# Patient Record
Sex: FEMALE | Race: WHITE | NOT HISPANIC OR LATINO | Employment: UNEMPLOYED | URBAN - METROPOLITAN AREA
[De-identification: names, ages, dates, MRNs, and addresses within clinical notes are randomized per-mention and may not be internally consistent; named-entity substitution may affect disease eponyms.]

---

## 2017-10-12 ENCOUNTER — APPOINTMENT (OUTPATIENT)
Dept: WOMENS IMAGING | Facility: HOSPITAL | Age: 57
End: 2017-10-12

## 2017-10-12 PROCEDURE — 77067 SCR MAMMO BI INCL CAD: CPT | Performed by: RADIOLOGY

## 2019-01-31 ENCOUNTER — APPOINTMENT (OUTPATIENT)
Dept: WOMENS IMAGING | Facility: HOSPITAL | Age: 59
End: 2019-01-31

## 2019-01-31 PROCEDURE — 77067 SCR MAMMO BI INCL CAD: CPT | Performed by: RADIOLOGY

## 2019-01-31 PROCEDURE — 77063 BREAST TOMOSYNTHESIS BI: CPT | Performed by: RADIOLOGY

## 2019-02-26 ENCOUNTER — APPOINTMENT (OUTPATIENT)
Dept: WOMENS IMAGING | Facility: HOSPITAL | Age: 59
End: 2019-02-26

## 2019-02-26 PROCEDURE — 76641 ULTRASOUND BREAST COMPLETE: CPT | Performed by: RADIOLOGY

## 2019-02-26 PROCEDURE — 77061 BREAST TOMOSYNTHESIS UNI: CPT | Performed by: RADIOLOGY

## 2019-02-26 PROCEDURE — 77065 DX MAMMO INCL CAD UNI: CPT | Performed by: RADIOLOGY

## 2019-02-26 PROCEDURE — G0279 TOMOSYNTHESIS, MAMMO: HCPCS | Performed by: RADIOLOGY

## 2019-02-26 PROCEDURE — MDREVIEWSP: Performed by: RADIOLOGY

## 2019-06-04 ENCOUNTER — APPOINTMENT (OUTPATIENT)
Dept: WOMENS IMAGING | Facility: HOSPITAL | Age: 59
End: 2019-06-04

## 2019-06-04 PROCEDURE — 19000 PUNCTURE ASPIR CYST BREAST: CPT | Performed by: RADIOLOGY

## 2019-06-04 PROCEDURE — 76942 ECHO GUIDE FOR BIOPSY: CPT | Performed by: RADIOLOGY

## 2019-06-04 PROCEDURE — 19001 PUNCTURE ASPIR CYST BRST EA: CPT | Performed by: RADIOLOGY

## 2020-05-11 ENCOUNTER — APPOINTMENT (OUTPATIENT)
Dept: WOMENS IMAGING | Facility: HOSPITAL | Age: 60
End: 2020-05-11

## 2020-05-11 PROCEDURE — 76641 ULTRASOUND BREAST COMPLETE: CPT | Performed by: RADIOLOGY

## 2020-05-11 PROCEDURE — 77062 BREAST TOMOSYNTHESIS BI: CPT | Performed by: RADIOLOGY

## 2020-05-11 PROCEDURE — 77066 DX MAMMO INCL CAD BI: CPT | Performed by: RADIOLOGY

## 2020-05-11 PROCEDURE — G0279 TOMOSYNTHESIS, MAMMO: HCPCS | Performed by: RADIOLOGY

## 2020-05-13 ENCOUNTER — APPOINTMENT (OUTPATIENT)
Dept: WOMENS IMAGING | Facility: HOSPITAL | Age: 60
End: 2020-05-13

## 2020-05-13 PROCEDURE — 76942 ECHO GUIDE FOR BIOPSY: CPT | Performed by: RADIOLOGY

## 2020-05-13 PROCEDURE — 19000 PUNCTURE ASPIR CYST BREAST: CPT | Performed by: RADIOLOGY

## 2021-05-15 ENCOUNTER — IMMUNIZATION (OUTPATIENT)
Dept: VACCINE CLINIC | Facility: HOSPITAL | Age: 61
End: 2021-05-15

## 2021-05-15 PROCEDURE — 91300 HC SARSCOV02 VAC 30MCG/0.3ML IM: CPT | Performed by: INTERNAL MEDICINE

## 2021-05-15 PROCEDURE — 0001A: CPT | Performed by: INTERNAL MEDICINE

## 2021-06-05 ENCOUNTER — APPOINTMENT (OUTPATIENT)
Dept: VACCINE CLINIC | Facility: HOSPITAL | Age: 61
End: 2021-06-05

## 2021-06-17 ENCOUNTER — APPOINTMENT (OUTPATIENT)
Dept: WOMENS IMAGING | Facility: HOSPITAL | Age: 61
End: 2021-06-17

## 2021-06-17 PROCEDURE — 77067 SCR MAMMO BI INCL CAD: CPT | Performed by: RADIOLOGY

## 2021-06-17 PROCEDURE — 77063 BREAST TOMOSYNTHESIS BI: CPT | Performed by: RADIOLOGY

## 2021-07-21 ENCOUNTER — APPOINTMENT (OUTPATIENT)
Dept: WOMENS IMAGING | Facility: HOSPITAL | Age: 61
End: 2021-07-21

## 2021-07-21 PROCEDURE — G0279 TOMOSYNTHESIS, MAMMO: HCPCS | Performed by: RADIOLOGY

## 2021-07-21 PROCEDURE — 77065 DX MAMMO INCL CAD UNI: CPT | Performed by: RADIOLOGY

## 2021-07-21 PROCEDURE — 77061 BREAST TOMOSYNTHESIS UNI: CPT | Performed by: RADIOLOGY

## 2021-07-21 PROCEDURE — 76641 ULTRASOUND BREAST COMPLETE: CPT | Performed by: RADIOLOGY

## 2021-08-19 ENCOUNTER — OFFICE VISIT (OUTPATIENT)
Dept: INTERNAL MEDICINE | Facility: CLINIC | Age: 61
End: 2021-08-19

## 2021-08-19 VITALS
TEMPERATURE: 97.1 F | HEIGHT: 67 IN | OXYGEN SATURATION: 97 % | HEART RATE: 80 BPM | DIASTOLIC BLOOD PRESSURE: 68 MMHG | BODY MASS INDEX: 24.61 KG/M2 | WEIGHT: 156.8 LBS | SYSTOLIC BLOOD PRESSURE: 100 MMHG

## 2021-08-19 DIAGNOSIS — Z00.00 ANNUAL PHYSICAL EXAM: Primary | ICD-10-CM

## 2021-08-19 DIAGNOSIS — Z13.1 SCREENING FOR DIABETES MELLITUS: ICD-10-CM

## 2021-08-19 DIAGNOSIS — Z12.11 SCREENING FOR COLON CANCER: ICD-10-CM

## 2021-08-19 DIAGNOSIS — Z13.220 SCREENING FOR HYPERLIPIDEMIA: ICD-10-CM

## 2021-08-19 LAB
CHOLEST SERPL-MCNC: 178 MG/DL (ref 0–200)
HBA1C MFR BLD: 5.3 % (ref 4.8–5.6)
HDLC SERPL-MCNC: 58 MG/DL (ref 40–60)
LDLC SERPL CALC-MCNC: 105 MG/DL (ref 0–100)
LDLC/HDLC SERPL: 1.8 {RATIO}
TRIGL SERPL-MCNC: 79 MG/DL (ref 0–150)
VLDLC SERPL CALC-MCNC: 15 MG/DL (ref 5–40)

## 2021-08-19 PROCEDURE — 99386 PREV VISIT NEW AGE 40-64: CPT | Performed by: STUDENT IN AN ORGANIZED HEALTH CARE EDUCATION/TRAINING PROGRAM

## 2021-08-19 RX ORDER — CHLORAL HYDRATE 500 MG
1 CAPSULE ORAL
COMMUNITY

## 2021-08-19 RX ORDER — MAGNESIUM 200 MG
1 TABLET ORAL
COMMUNITY

## 2021-08-19 NOTE — PATIENT INSTRUCTIONS

## 2021-08-19 NOTE — PROGRESS NOTES
"Chief Complaint  Annual checkup    HISTORY    Beronica Price is a 61 y.o. female who presents to the office today as a  a new patient for their annual preventative exam. Their last preventative exam was about 5 years ago approx with Dr Childers.  She last did her gynecology exam with GYN in June Dr Shabana Reynaga.     Pt states her dad got COVID at the prison home and then she got COVID Feburary 2021. She got her COVID vaccine a few months ago.   She states some of her smell is still messed up and has some early fatigue, doesn't necessarily have to be at the end of the day. If she takes a nap she feels better.  At the same time as her Covid onset she had a sharp pain left-sided chest that was better when she held her breath.  That has resolved completely and she denies chest pain pressure or tightness or shortness of air at either rest or with exertion.    No hospitalization(s) within the last year.     Status of chronic medical conditions:  *Thyroid nodules: sees an ENT provider at [facility] for that. In the spring she saw them and they did an ultrasound and they were reportedly unchanged in size . Years ago they biopsied them and they were \"fine\".   *Breast cysts: she has had multiple biopsies for cysts and she is getting frustrated. She has had those done at [facility]. She opted to not go back for now around 2 or 3 weeks ago for more aspiration. She is OK getting her mammogram every year but she no longer wants aspirations of cysts. She now takes vitamin E and iodine at the recommendation of her GYN Dr Reynaga for this.         Allergies   Allergen Reactions   • Gentamycin [Gentamicin] Hives   • Penicillins Hives        Outpatient Medications Marked as Taking for the 8/19/21 encounter (Office Visit) with Ulises Keller, DO   Medication Sig Dispense Refill   • ESTROGEL 0.75 MG/1.25 GM (0.06%) topical gel APPLY 2 PUMPS TO SKIN D UTD  0   • Magnesium 200 MG tablet Take 1 tablet by mouth.   " "  • medroxyPROGESTERone (PROVERA) 2.5 MG tablet TK 1 T PO  D  11   • Omega-3 1000 MG capsule Take 1 capsule by mouth.     • vitamin D3 125 MCG (5000 UT) capsule capsule Take 5,000 Units by mouth Daily.          Past Medical History:   Diagnosis Date   • Bilateral breast cysts    • Dense breasts    • Multiple thyroid nodules    • Transaminitis    • Uterine polyp      Past Surgical History:   Procedure Laterality Date   • ENDOMETRIAL ABLATION  2003   • MASTOIDECTOMY     • TONSILLECTOMY       Family History   Problem Relation Age of Onset   • Hypertension Mother    • COPD Mother    • Atrial fibrillation Father    • Heart block Father    • Multiple sclerosis Sister    • No Known Problems Brother    • Uterine cancer Maternal Grandmother 48   • Prostate cancer Maternal Grandfather    • Heart block Paternal Grandmother    • No Known Problems Sister    • Coronary artery disease Brother 63    reports that she has never smoked. She has never used smokeless tobacco. She reports current alcohol use of about 2.0 standard drinks of alcohol per week. She reports that she does not use drugs.    Immunization History   Administered Date(s) Administered   • COVID-19 (PFIZER) 05/15/2021, 06/16/2021   • Flu Vaccine Quad PF >36MO 10/12/2017, 10/05/2020   • Influenza, Unspecified 02/02/2017, 01/31/2019        OBJECTIVE    Vital Signs:   /68   Pulse 80   Temp 97.1 °F (36.2 °C) (Temporal)   Ht 171 cm (67.32\")   Wt 71.1 kg (156 lb 12.8 oz)   SpO2 97%   BMI 24.32 kg/m²     Physical Exam  Constitutional:       General: She is not in acute distress.     Appearance: Normal appearance.   HENT:      Head: Normocephalic and atraumatic.      Right Ear: Tympanic membrane, ear canal and external ear normal. There is no impacted cerumen.      Left Ear: Tympanic membrane, ear canal and external ear normal. There is no impacted cerumen.      Mouth/Throat:      Mouth: Mucous membranes are moist.      Pharynx: No oropharyngeal exudate or " posterior oropharyngeal erythema.   Eyes:      General: No scleral icterus.     Extraocular Movements: Extraocular movements intact.      Conjunctiva/sclera: Conjunctivae normal.      Pupils: Pupils are equal, round, and reactive to light.   Cardiovascular:      Rate and Rhythm: Normal rate and regular rhythm.      Heart sounds: Normal heart sounds. No murmur heard.     Pulmonary:      Effort: Pulmonary effort is normal. No respiratory distress.      Breath sounds: Normal breath sounds. No wheezing.   Abdominal:      General: Bowel sounds are normal. There is no distension.      Palpations: Abdomen is soft.      Tenderness: There is abdominal tenderness (LLQ). There is no guarding.   Musculoskeletal:      Cervical back: Neck supple.      Right lower leg: No edema.      Left lower leg: No edema.   Lymphadenopathy:      Cervical: No cervical adenopathy.   Skin:     General: Skin is warm and dry.      Coloration: Skin is not jaundiced.   Neurological:      General: No focal deficit present.      Mental Status: She is alert and oriented to person, place, and time.      Cranial Nerves: No cranial nerve deficit.      Motor: No weakness.   Psychiatric:         Mood and Affect: Mood normal.         Behavior: Behavior normal.         Thought Content: Thought content normal.            ASSESSMENT & PLAN     #Annual Preventative Health Examination  -Patient presents today for preventative health exam. The patient's last preventative health exam was 5 years. Discussed with patient the importance of an annual preventative exam and encouraged continued yearly annual exams.   -Age and sex appropriate physical exam performed and documented. Updated past medical, family, social and surgical histories as well as allergies and care team list. Addressed care gaps listed in the medical record.  -Encouraged seat belt use for every car ride for patient and all occupants. Discussed securing of all guns in the home for patient and family  protection. Encouraged sunscreen use to reduce risk of skin cancer for any days with sun exposure over 20 minutes. Recommended helmet if biking or riding motorcycle to prevent head trauma. Discussed the importance of smoke and carbon monoxide detectors in the home.   -Encouraged annual dental and vision exams as part of their overall health.  -Encouraged minimum of 30 minutes or more of exercise at a brisk walk or higher 5 days per week combined with a well-balanced diet. Exercise and dietary instructions will be provided in after visit summary.  -Immunizations reviewed and updated in EMR.  -Lipid screening:  will order lipid panel today  -Aspirin for primary or secondary prevention: Advised patient that aspirin 81 mg for prevention of coronary artery disease in healthy adults is controversial due to risk of GI bleeding, though some studies have shown that it could lead to decrease in colon cancer and heart attack. The risk for bleeding goes up as you get older and the risks likely outweigh the benefits in patients with a history of GI bleeding or peptic ulcer disease or bleeding at other sites, age > 70 years, thrombocytopenia, coagulopathy, chronic kidney disease, or concurrent use of other medications that increase bleeding risks such as NSAIDs, steroids, anticoagulants or warfarin. Patient declined aspirin therapy.  -Depression screening: PHQ2 performed and the patient's screen was negative.   -Diabetes screening:  Pt wishes to have A1c today, will order.   -Tobacco use screening: Patient denies cigarette use.   -Alcohol use screening: Patient reports drinking 2 drinks per week, negative screening.   -Illicit drug screening: Patient does not use illicit drugs.   -Hypertension screening: Patient screened negative for HTN today.  -HIV screening: Discussed with patient the importance of identifying asymptomatic HIV infection for adults in their age group with a one time screening test .Patient declined screening.    -Syphilis screening: Syphilis screening not indicated.  -Hepatitis B virus screening: Screening not indicated, not in a high-risk group.  -Hepatitis C virus screening:  Discussed with patient that the USPSTF recommends a one-time screening of hepatitis C in all adults 18-79 years old. Patient declined screening.  -Colon cancer screening: Patient is age 45-75 and I discussed the importance of colon cancer screening in order to detect cancerous or pre-cancerous lesions early in an effort to limit severity of cancer and possibly death if it is present. I informed the patient that screening for colon cancer has risks but these risks are small and include risks associated with a colonoscopy such as bleeding, pain or perforation, or the potential to need additional follow up tests or surgeries depending on the results. Discussed the options of colon cancer screening (i.e., colonoscopy vs FIT testing) and the benefits and disadvantages of each. Patient accepted screening. Will order cologuard today per patient's preference.   -Lung cancer screening: Patient has never smoked.  -Cervical cancer screening:  Informed patient that the USPSTF recommends screening for cervical cancer every 3 years with cervical cytology alone in women aged 21 to 29 years. For women aged 30 to 65 years, the USPSTF recommends screening every 3 years with cervical cytology alone, every 5 years with high-risk human papillomavirus (hrHPV) testing alone, or every 5 years with hrHPV testing in combination with cytology (cotesting). Lasp pap: approximate date 2021 and was normal will obtain records  -Breast cancer screening: Informed patient that the USPSTF recommends biennial screening mammography for women aged 50 to 74 years. patient is already seeing GYN and having mammograms and has a history of breast cysts for which she periodically has had aspirations. Will obtain records  -BRCA related cancer screening: Informed patient that the USPSTF  recommends that primary care clinicians assess women with a personal or family history of breast, ovarian, tubal, or peritoneal cancer or who have an ancestry associated with breast cancer susceptibility 1 and 2 (BRCA1/2) gene mutations with an appropriate brief familial risk assessment tool and referred to genetic counseling if risk assessment is positive. Patient does not have a known personal or family history.   -Osteoporosis screening: Informed patient that the USPSTF recommends screening for osteoporosis with bone measurement testing to prevent osteoporotic fractures in women 65 years and older. Not age 65 yet but she has had one before and it was normal.       Follow Up  Follow up in 1 year for annual physical exam.    Patient/family had no further questions at this time and verbalized understanding of the plan discussed today.

## 2021-08-20 LAB
ALBUMIN SERPL-MCNC: 4.8 G/DL (ref 3.5–5.2)
ALBUMIN/GLOB SERPL: 2.3 G/DL
ALP SERPL-CCNC: 75 U/L (ref 39–117)
ALT SERPL-CCNC: 20 U/L (ref 1–33)
AST SERPL-CCNC: 17 U/L (ref 1–32)
BASOPHILS # BLD AUTO: 0.07 10*3/MM3 (ref 0–0.2)
BASOPHILS NFR BLD AUTO: 0.9 % (ref 0–1.5)
BILIRUB SERPL-MCNC: 0.4 MG/DL (ref 0–1.2)
BUN SERPL-MCNC: 11 MG/DL (ref 8–23)
BUN/CREAT SERPL: 16.2 (ref 7–25)
CALCIUM SERPL-MCNC: 10 MG/DL (ref 8.6–10.5)
CHLORIDE SERPL-SCNC: 106 MMOL/L (ref 98–107)
CO2 SERPL-SCNC: 24.6 MMOL/L (ref 22–29)
CREAT SERPL-MCNC: 0.68 MG/DL (ref 0.57–1)
EOSINOPHIL # BLD AUTO: 0.05 10*3/MM3 (ref 0–0.4)
EOSINOPHIL NFR BLD AUTO: 0.6 % (ref 0.3–6.2)
ERYTHROCYTE [DISTWIDTH] IN BLOOD BY AUTOMATED COUNT: 12.4 % (ref 12.3–15.4)
GLOBULIN SER CALC-MCNC: 2.1 GM/DL
GLUCOSE SERPL-MCNC: 91 MG/DL (ref 65–99)
HCT VFR BLD AUTO: 43.1 % (ref 34–46.6)
HGB BLD-MCNC: 14.2 G/DL (ref 12–15.9)
IMM GRANULOCYTES # BLD AUTO: 0.05 10*3/MM3 (ref 0–0.05)
IMM GRANULOCYTES NFR BLD AUTO: 0.6 % (ref 0–0.5)
LYMPHOCYTES # BLD AUTO: 1.28 10*3/MM3 (ref 0.7–3.1)
LYMPHOCYTES NFR BLD AUTO: 16.6 % (ref 19.6–45.3)
Lab: NORMAL
MCH RBC QN AUTO: 30.3 PG (ref 26.6–33)
MCHC RBC AUTO-ENTMCNC: 32.9 G/DL (ref 31.5–35.7)
MCV RBC AUTO: 92.1 FL (ref 79–97)
MONOCYTES # BLD AUTO: 0.5 10*3/MM3 (ref 0.1–0.9)
MONOCYTES NFR BLD AUTO: 6.5 % (ref 5–12)
NEUTROPHILS # BLD AUTO: 5.76 10*3/MM3 (ref 1.7–7)
NEUTROPHILS NFR BLD AUTO: 74.8 % (ref 42.7–76)
NRBC BLD AUTO-RTO: 0 /100 WBC (ref 0–0.2)
PLATELET # BLD AUTO: 266 10*3/MM3 (ref 140–450)
POTASSIUM SERPL-SCNC: 4.5 MMOL/L (ref 3.5–5.2)
PROT SERPL-MCNC: 6.9 G/DL (ref 6–8.5)
RBC # BLD AUTO: 4.68 10*6/MM3 (ref 3.77–5.28)
SODIUM SERPL-SCNC: 142 MMOL/L (ref 136–145)
WBC # BLD AUTO: 7.71 10*3/MM3 (ref 3.4–10.8)
WRITTEN AUTHORIZATION: NORMAL

## 2022-08-15 ENCOUNTER — PATIENT ROUNDING (BHMG ONLY) (OUTPATIENT)
Dept: MAMMOGRAPHY | Facility: CLINIC | Age: 62
End: 2022-08-15

## 2022-08-15 ENCOUNTER — CLINICAL SUPPORT (OUTPATIENT)
Dept: OTHER | Facility: HOSPITAL | Age: 62
End: 2022-08-15

## 2022-08-15 ENCOUNTER — OFFICE VISIT (OUTPATIENT)
Dept: MAMMOGRAPHY | Facility: CLINIC | Age: 62
End: 2022-08-15

## 2022-08-15 VITALS
DIASTOLIC BLOOD PRESSURE: 62 MMHG | BODY MASS INDEX: 23.64 KG/M2 | WEIGHT: 156 LBS | SYSTOLIC BLOOD PRESSURE: 108 MMHG | OXYGEN SATURATION: 99 % | HEART RATE: 69 BPM | HEIGHT: 68 IN

## 2022-08-15 DIAGNOSIS — Z80.3 FAMILY HISTORY OF BREAST CANCER: ICD-10-CM

## 2022-08-15 DIAGNOSIS — Z13.79 GENETIC TESTING: Primary | ICD-10-CM

## 2022-08-15 DIAGNOSIS — N63.21 MASS OF UPPER OUTER QUADRANT OF LEFT BREAST: ICD-10-CM

## 2022-08-15 DIAGNOSIS — Z91.89 AT HIGH RISK FOR BREAST CANCER: Primary | ICD-10-CM

## 2022-08-15 DIAGNOSIS — Z80.49 FAMILY HISTORY OF UTERINE CANCER: ICD-10-CM

## 2022-08-15 PROCEDURE — 99204 OFFICE O/P NEW MOD 45 MIN: CPT | Performed by: SURGERY

## 2022-08-15 PROCEDURE — 96040: CPT | Performed by: GENETIC COUNSELOR, MS

## 2022-08-15 RX ORDER — ESTRADIOL 0.07 MG/D
FILM, EXTENDED RELEASE TRANSDERMAL
COMMUNITY
Start: 2022-08-12

## 2022-08-15 NOTE — PROGRESS NOTES
Beronica Price, a 62-year-old female, was referred to genetic counseling due to a family history of uterine cancer and other cancers. Ms. Price has no personal history of cancer. She was 12 years old at menarche and her first child was born when she was 36. She is postmenopausal and retains her uterus and ovaries. She had a mammogram in July 2021. These showed a cyst in the left breast that they will continue to monitor. She has had Colonguard done for her colon cancer screening and it has been normal. Ms. Price was interested in discussing her risk for a hereditary cancer syndrome and genetic testing options. Ms. Price was interested in pursuing testing but did not move forward with testing today.  She wants to investigate getting more insurance before sending in her sample.  [facility] so that she might send in her sample once her insurance issues have been resolved.    FAMILY HISTORY (see attached pedigree):    Mat Grandmother:  Uterine cancer, 48  Mat Aunt:  Breast cancer, 80  Mat Grandfather: Prostate cancer  Pat Aunt x2:  Breast cancer  Pat Uncle 1:  Lung cancer  Pat Uncle 2:  Prostate cancer  Pat Male Cousin: Colon cancer, 63  Pat Female Cousin: Breast cancer x2, >50    We do not have medical records regarding the diagnoses in her family.   RISK ASSESSMENT: Ms. Prcie’s family history led to concern regarding a hereditary cancer syndrome. NCCN guidelines for genetic testing for Quinn syndrome state that an individual with a close relative diagnosed with uterine cancer under the age of 50 may consider genetic testing. Based on Ms. Price’s maternal grandmother being diagnosed with uterine cancer in her 40s, she would clearly meet this criteria. We discussed that the standard approach to genetic testing is via a multi-gene panel that evaluates multiple cancer susceptibility genes simultaneously. Ms. Price opted to pursue testing via the CancerNext panel through [employer]. She  plans to send in her sample once she has resolved her insurance issues.     If genetic testing returns negative, Ms. Price’s management should be guided by personal and family history. We estimated Ms. Price’s breast cancer risk based on family history risk assessments.  Based on the reported family history, computer modeling estimates that Ms. Price’s lifetime risk for developing breast cancer is up to 12.0% (YISSEL/Tyrer-Cuzick model), which is above the general population risk for a 63-year old woman of 6.4%.  Per NCCN guidelines, a lifetime risk above 20% is considered “high risk” and increased screening is warranted. This risk assessment is based on the family history information provided at the time of the appointment. The assessment could change in the future should new information be obtained.    GENETIC COUNSELING (30 minutes):  We reviewed the family history information in detail. Cases of cancer follow three general patterns: sporadic, familial, and hereditary.  While most cancer is sporadic, some cases appear to occur in family clusters.  These cases are said to be familial and account for 10-20% of cancer cases.  Familial cases may be due to a combination of shared genes and environmental factors among family members.  In even fewer families, the increased cancer risk is inherited, and the genes responsible increased risk are known.  Family histories typical of hereditary cancer syndromes usually include multiple first- and second-degree relatives diagnosed with cancer types that define a syndrome. These cases tend to be diagnosed at younger-than-expected ages and can be bilateral or multifocal. The cancer in these families follows an autosomal dominant inheritance pattern, which indicates the likely presence of a mutation in a cancer susceptibility gene. Children and siblings of an individual believed to carry this mutation have a 50% chance of inheriting that mutation, thereby inheriting the increased risk  to develop cancer. These mutations can be passed down from the maternal or the paternal lineage.    Due to Ms. Price’s grandmother’s diagnosis of uterine cancer, we discussed that Quinn syndrome is the most common form of hereditary uterine cancer, as well as the most common form of hereditary colon cancer. It is an autosomal dominant condition caused by mutations in mismatch repair genes.  The lifetime risk for colon cancer for individuals with Quinn syndrome is up to 80% if there is no intervention (i.e. removal of polyps detected on colonoscopy).  Routine screening colonoscopy has been shown to significantly reduce the incidence and mortality of colon cancer in families with Quinn syndrome. The risk for endometrial cancer is up to 60%. And there are risk for other cancers including ovarian, upper GI, brain, stomach, pancreatic. We discussed the national management guidelines that have been established for individuals known to have Quinn syndrome.        We discussed there are other genes associated with increased cancer risk. Some of these conditions have well defined cancer risks and established management guidelines.  Other genes that can be tested for via multigene panels have been more recently described, and there may be less data regarding the risks and therefore may not have established management guidelines.  We discussed these limitations at length. Given the extent of the family history and the possibility of more than one genetic risk factor present in this family, Ms. Price is interested in multigene panel testing but chose not to pursue testing today.    GENETIC TESTING:  The risks, benefits and limitations of genetic testing and implications for clinical management following testing were reviewed. DNA test results can influence decisions regarding screening and prevention.  Genetic testing can have significant psychological implications for both individuals and families. Also discussed was the  possibility of employment and insurance discrimination based on genetic test results and the federal and states laws that are in place to prevent this, as well as the limitations of these laws (MIGUEL A).         We discussed panel testing, which would involve testing multiple genes associated with increased cancer risk, including BRCA1/2 and the genes associated with Quinn syndrome. The benefits and limitations of genetic testing were discussed. The implications of a positive or negative test result were discussed. We also discussed the importance of testing on an affected relative and the possibility that the cancers in Ms. Price’s family could be due to a genetic mutation that she did not inherit. We discussed the possibility that, in some cases, genetic test results may be ambiguous due to the identification of a genetic variant. These variants may or may not be associated with an increased cancer risk. With multigene panel testing, it is not uncommon for a variant of uncertain significance (VUS) to be identified.  If a VUS is identified, testing family members is not recommended and screening recommendations are made based on the family history. The laboratories that perform genetic testing work to reclassify the VUS and send out an amended report if and when a VUS is reclassified.  The majority of variant findings are ultimately reclassified to a negative result. Given her family history, a negative test result does not eliminate all cancer risk, although the risk would not be as high as it would with positive genetic testing.     PLAN:  Ms. Price was interested in pursuing testing but did not move forward with testing today.  She wants to investigate getting more insurance before sending in her sample.  We discussed ordering the CancerNext panel thru [employer] and having them send her a saliva kit to her home.  She could then send in her sample once she had resolved issues with her insurance coverage.  Results  are expected in 2-3 weeks after Violet has received her sample. We will contact Ms. Price with her results once they are received. If she has any questions or concerns in the meantime, she can contact our office at [phone].    Nanda Rodriguez MS, [facility]  Licensed Certified Genetic Counselor

## 2022-08-15 NOTE — PROGRESS NOTES
August 15, 2022    Hello, may I speak with Beronica Price?    My name is Chepe       I am  with [facility]  [address]  [facility] [address] [phone].    Before we get started may I verify your date of birth? 1960    I am calling to officially welcome you to our practice and ask about your recent visit. Is this a good time to talk? Yes, In office    Tell me about your visit with us. What things went well?  Everything was great!       We're always looking for ways to make our patients' experiences even better. Do you have recommendations on ways we may improve? No     Overall were you satisfied with your first visit to our practice? Yes       I appreciate you taking the time to speak with me today. Is there anything else I can do for you? No      Thank you, and have a great day.

## 2022-08-15 NOTE — PROGRESS NOTES
Chief Complaint: Beronica Price is a 62 y.o.. female here today for Abnormal Breast Imaging        History of Present Illness:  Patient presents with abnormal breast imaging.   She is a nice 62-year-old white female with a relatively long history of imaging abnormalities.  In June 2021 the patient had screening mammograms performed.  I have personally reviewed those imaging studies.  The initial studies described an oval mass in the posterior one third upper outer quadrant of the left breast.  She went back for diagnostic imaging of the left breast.  Those images revealed extremely dense breast tissue.  The area of concern seems to resolved with compression.  An ultrasound was then performed and then the area of mammographic concern and there was no sonographic abnormality.  They did discover a debris-containing cyst measuring 8 mm in the subareolar region of the left breast.  There was also a complicated cyst measuring 5 mm in the 3 o'clock position.  Aspiration of this was recommended.  On my review of these films this area at 3:00 is pretty circular in shape with perhaps a little thickening of the wall along 1 side.  It does appear to be mostly cystic.  The patient's father had a number of health problems and the patient also developed COVID.  As a result of these things she did not undergo the aspiration.  She is now a year down the road and has not had any further testing.  We did perform risk assessment on her and her lifetime risk is estimated to be between 20 and 30%.  Her 5-year risk is also elevated at 2.6%.  These findings would qualify her to have additional imaging with MRIs and the patient would like to pursue that.  We do need to repeat her mammogram and especially her ultrasound to see if this area of concern in the left breast has resolved.    The patient's family history is significant for a maternal aunt with breast cancer as well as a paternal aunt with breast cancer both in their 70s.  There is  "also a maternal grandmother with uterine cancer.  Review of Systems:  Review of Systems   Skin:        The patient denies any noticeable changes to the skin of the breast.    All other systems reviewed and are negative.     I have reviewed the ROS as documented by the MA/LPN/RN Jeffrey Villarreal MD      Past Medical and Surgical History:  Breast Biopsy History:  Patient has had the following breast biopsies:1 \"a few years ago\" benign  Breast Cancer HIstory:  Patient does not have a past medical history of breast cancer.  Breast Operations, and year:  0  Social History     Tobacco Use   Smoking Status Never Smoker   Smokeless Tobacco Never Used     Obstetric History:  Patient is postmenopausal, entered menopause naturally at age: Pt unsure due to an ablation   Number of pregnancies:2  Number of live births: 2  Number of abortions or miscarriages: 0  Age of delivery of first child: 36  Patient breast fed, for the following lenth of time:1 year  Length of time taking birth control pills:3-4 years  Patient is presently taking the following hormone replacement:Estrogen and progesterone patch    Past Surgical History:   Procedure Laterality Date   • ENDOMETRIAL ABLATION  2003   • MASTOIDECTOMY     • TONSILLECTOMY         Past Medical History:   Diagnosis Date   • Bilateral breast cysts    • Dense breasts    • Multiple thyroid nodules    • Transaminitis    • Uterine polyp        Prior Hospitalizations, other than for surgery or childbirth, and year:  0    Social History:  Patient is .  Patient has 2 daughters.    Family History:  Family History   Problem Relation Age of Onset   • Hypertension Mother    • COPD Mother    • Atrial fibrillation Father    • Heart block Father    • Multiple sclerosis Sister    • No Known Problems Brother    • Uterine cancer Maternal Grandmother 48   • Prostate cancer Maternal Grandfather    • Heart block Paternal Grandmother    • No Known Problems Sister    • Coronary artery disease " Brother 63       Vital Signs:  Vitals:    08/15/22 1027   BP: 108/62   Pulse: 69   SpO2: 99%       Medications:    Current Outpatient Prescriptions:     Current Outpatient Medications:   •  estradiol (MINIVELLE, VIVELLE-DOT) 0.075 MG/24HR patch, , Disp: , Rfl:   •  Magnesium 200 MG tablet, Take 1 tablet by mouth., Disp: , Rfl:   •  medroxyPROGESTERone (PROVERA) 2.5 MG tablet, TK 1 T PO  D, Disp: , Rfl: 11  •  Omega-3 1000 MG capsule, Take 1 capsule by mouth., Disp: , Rfl:   •  vitamin D3 125 MCG (5000 UT) capsule capsule, Take 5,000 Units by mouth Daily., Disp: , Rfl:     Physical Examination:  General Appearance:   Patient is in no distress.  She is well kept and has an average build.   Psychiatric:  Patient with appropriate mood and affect. Alert and oriented to self, time, and place.    Breast, RIGHT:  small sized, symmetric with the contralateral side.  Breast skin is without erythema, edema, rashes.  There are no visible abnormalities upon inspection during the arm-raising maneuver or with hands on hips in the sitting position. There is no nipple retraction, discharge or nipple/areolar skin changes.There are no masses palpable in the sitting or supine positions.  The breast tissue is extremely nodular and dense throughout.    Breast, LEFT:  small sized, symmetric with the contralateral side.  Breast skin is without erythema, edema, rashes.  There are no visible abnormalities upon inspection during the arm-raising maneuver or with hands on hips in the sitting position. There is no nipple retraction, discharge or nipple/areolar skin changes.There are no masses palpable in the sitting or supine positions.  The breast tissue is symmetrically extremely nodular and dense throughout.  Lymphatic:  There is no axillary, cervical, infraclavicular, or supraclavicular adenopathy bilaterally.    Gastrointestinal:  Abdomen is soft, nondistended, and nontender.  There was no obvious hepatosplenomegaly or abdominal mass.   There are no scars from previous surgery.    Musculoskeletal:  Good strength in all 4 extremities.   There is good range of motion in both shoulders.      Assessment:  1. At high risk for breast cancer    The patient is aware that an elevated risk for breast cancer is associated with a genetic mutation, strong family history for breast cancer, LCIS, atypical hyperplasia, or history of radiation to the chest wall under the age of 30.  In this case her risks are increased by having children at a later age, family history, and a history of breast biopsies.  She would qualify for supplemental MRI imaging and she would like to do that.  We do need to go ahead and obtain a mammogram and an ultrasound to clear the area in the left breast.  If we know that area is stable, we plan to get an MRI in March.  The patient does meet criteria for testing for Quinn syndrome and I went ahead and send her down to the genetics counselor today.    She is also aware of the importance of exercise, maintaining ideal body weight, and limiting alcohol intake.      Plan:  1.  I ordered diagnostic mammograms and a left breast ultrasound.  2.  We will obtain an MRI in 6 months.  3.  I would like to see her back in the office sometime after her MRI.  4.  The patient will have genetic evaluation today.      CPT coding:    Next Appointment:  No follow-ups on file.            EMR Dragon/transcription disclaimer:    Much of this encounter note is an electronic transcription/translocation of spoken language to printed text.  The electronic translation of spoken language may permit erroneous, or at times, nonsensical words or phrases to be inadvertently transcribed.  Although I have reviewed the note from such areas, some may still exist.

## 2022-08-23 ENCOUNTER — TELEPHONE (OUTPATIENT)
Dept: MAMMOGRAPHY | Facility: CLINIC | Age: 62
End: 2022-08-23

## 2022-08-23 NOTE — TELEPHONE ENCOUNTER
Scheduled pts future imaging. Called and LM for pt and let her know her mammogram dx bialteral is on 09/13/2022 @ 2:00PM with a 1:45PM arrival. The  breast left limited will forllow @ 2:30PM. Gave call back # [phone] for questions or concerns and [phone] for scheduling. PT gave verbal consent to LM about details of future appts. MB

## 2022-08-26 ENCOUNTER — TELEPHONE (OUTPATIENT)
Dept: GENETICS | Facility: HOSPITAL | Age: 62
End: 2022-08-26

## 2022-08-29 ENCOUNTER — DOCUMENTATION (OUTPATIENT)
Dept: GENETICS | Facility: HOSPITAL | Age: 62
End: 2022-08-29

## 2022-08-29 NOTE — PROGRESS NOTES
Beronica Price, a 62-year-old female, was referred to genetic counseling due to a family history of uterine cancer and other cancers. Ms. Price has no personal history of cancer. She was 12 years old at menarche and her first child was born when she was 36. She is postmenopausal and retains her uterus and ovaries. She had a mammogram in July 2021. These showed a cyst in the left breast that they will continue to monitor. She has had Colonguard done for her colon cancer screening and it has been normal. Ms. Price was interested in discussing her risk for a hereditary cancer syndrome and genetic testing options. Ms. Price did opt to with forward with genetic testing.  The CancerNext panel through [facility] was ordered.  This panel evaluates 36 genes associated with an increased risk for cancer.  Genetic testing was negative for pathogenic mutations in BRCA1/2 and 34 additional genes included on this panel.  These normal results were discussed with Ms. Price by telephone on 8/26/2022.    FAMILY HISTORY (see attached pedigree):    Mat Grandmother:  Uterine cancer, 48  Mat Aunt:  Breast cancer, 80  Mat Grandfather: Prostate cancer  Pat Aunt x2:  Breast cancer  Pat Uncle 1:  Lung cancer  Pat Uncle 2:  Prostate cancer  Pat Male Cousin: Colon cancer, 63  Pat Female Cousin: Breast cancer x2, >50    We do not have medical records regarding the diagnoses in her family.   RISK ASSESSMENT: Ms. Price’s family history led to concern regarding a hereditary cancer syndrome. NCCN guidelines for genetic testing for Quinn syndrome state that an individual with a close relative diagnosed with uterine cancer under the age of 50 may consider genetic testing. Based on Ms. Price’s maternal grandmother being diagnosed with uterine cancer in her 40s, she would clearly meet this criteria. We discussed that the standard approach to genetic testing is via a multi-gene panel that evaluates multiple cancer susceptibility genes simultaneously. Ms. Price  opted to pursue testing via the CancerNext panel through [facility]. These risk assessments are based on the family history information provided at the time of the appointment.  The assessments could change in the future should new information be obtained.    At this time, Ms. Price’s management should be guided by personal and family history. We estimated Ms. Nelsons breast cancer risk based on family history risk assessments.  Based on the reported family history, computer modeling estimates that Ms. Nelsons lifetime risk for developing breast cancer is up to 12.0% (YISSEL/Tyrer-Cuzick model), which is above the general population risk for a 62 -year old woman of 6.4%.  Per NCCN guidelines, a lifetime risk above 20% is considered “high risk” and increased screening is warranted. This risk assessment is based on the family history information provided at the time of the appointment. The assessment could change in the future should new information be obtained.    GENETIC COUNSELING (30 minutes):  We reviewed the family history information in detail. Cases of cancer follow three general patterns: sporadic, familial, and hereditary.  While most cancer is sporadic, some cases appear to occur in family clusters.  These cases are said to be familial and account for 10-20% of cancer cases.  Familial cases may be due to a combination of shared genes and environmental factors among family members.  In even fewer families, the increased cancer risk is inherited, and the genes responsible increased risk are known.  Family histories typical of hereditary cancer syndromes usually include multiple first- and second-degree relatives diagnosed with cancer types that define a syndrome. These cases tend to be diagnosed at younger-than-expected ages and can be bilateral or multifocal. The cancer in these families follows an autosomal dominant inheritance pattern, which indicates the likely presence of a mutation in a cancer  susceptibility gene. Children and siblings of an individual believed to carry this mutation have a 50% chance of inheriting that mutation, thereby inheriting the increased risk to develop cancer. These mutations can be passed down from the maternal or the paternal lineage.    Due to Ms. Price’s grandmother’s diagnosis of uterine cancer, we discussed that Quinn syndrome is the most common form of hereditary uterine cancer, as well as the most common form of hereditary colon cancer. It is an autosomal dominant condition caused by mutations in mismatch repair genes.  The lifetime risk for colon cancer for individuals with Quinn syndrome is up to 80% if there is no intervention (i.e. removal of polyps detected on colonoscopy).  Routine screening colonoscopy has been shown to significantly reduce the incidence and mortality of colon cancer in families with Quinn syndrome. The risk for endometrial cancer is up to 60%. And there are risk for other cancers including ovarian, upper GI, brain, stomach, pancreatic. We discussed the national management guidelines that have been established for individuals known to have Quinn syndrome.        We discussed there are other genes associated with increased cancer risk. Some of these conditions have well defined cancer risks and established management guidelines.  Other genes that can be tested for via multigene panels have been more recently described, and there may be less data regarding the risks and therefore may not have established management guidelines.  We discussed these limitations at length. Given the extent of the family history and the possibility of more than one genetic risk factor present in this family, Ms. Price is interested in multigene panel testing.    GENETIC TESTING:  The risks, benefits and limitations of genetic testing and implications for clinical management following testing were reviewed. DNA test results can influence decisions regarding screening and  prevention.  Genetic testing can have significant psychological implications for both individuals and families. Also discussed was the possibility of employment and insurance discrimination based on genetic test results and the federal and states laws that are in place to prevent this, as well as the limitations of these laws (MIGUEL A).         We discussed panel testing, which would involve testing multiple genes associated with increased cancer risk, including BRCA1/2 and the genes associated with Quinn syndrome. The benefits and limitations of genetic testing were discussed. The implications of a positive or negative test result were discussed. We also discussed the importance of testing on an affected relative and the possibility that the cancers in Ms. Price’s family could be due to a genetic mutation that she did not inherit. We discussed the possibility that, in some cases, genetic test results may be ambiguous due to the identification of a genetic variant. These variants may or may not be associated with an increased cancer risk. With multigene panel testing, it is not uncommon for a variant of uncertain significance (VUS) to be identified.  If a VUS is identified, testing family members is not recommended and screening recommendations are made based on the family history. The laboratories that perform genetic testing work to reclassify the VUS and send out an amended report if and when a VUS is reclassified.  The majority of variant findings are ultimately reclassified to a negative result. Given her family history, a negative test result does not eliminate all cancer risk, although the risk would not be as high as it would with positive genetic testing.     TEST RESULTS:  Genetic testing was negative for pathogenic mutations by sequencing and rearrangement testing for the 36 genes on the CancerNext panel.  The negative result greatly lowers the risk of a hereditary cancer syndrome for Ms. Price.  It is  possible that the family history is due to a hereditary cancer syndrome which Ms. Price did not happen to inherit. This assessment is based on the information provided at the time of the consultation.    CANCER PREVENTION:  Options available to individuals with an elevated lifetime risk for breast and/or ovarian cancer were briefly discussed.  Based on computer modeling, Ms. Price’s lifetime risk for breast cancer would not be considered “high risk” (>20%).   Per NCCN guidelines, it is appropriate for her to follow general population screening guidelines for her breast cancer risk including annual clinical breast exam and annual mammography. These assessments are based on the information provided at the time of consultation.    PLAN:  Genetic counseling remains available to Ms. Price and her family. If she has any questions or concerns in the future, she can contact our office at [phone].    Nanda Rodriguez MS, [facility], [facility]  Licensed Certified Genetic Counselor      Cc: Beronica Villarreal MD

## 2022-09-08 ENCOUNTER — OFFICE VISIT (OUTPATIENT)
Dept: INTERNAL MEDICINE | Facility: CLINIC | Age: 62
End: 2022-09-08

## 2022-09-08 VITALS
TEMPERATURE: 98 F | SYSTOLIC BLOOD PRESSURE: 100 MMHG | BODY MASS INDEX: 23.79 KG/M2 | OXYGEN SATURATION: 98 % | DIASTOLIC BLOOD PRESSURE: 60 MMHG | HEART RATE: 68 BPM | WEIGHT: 157 LBS | HEIGHT: 68 IN

## 2022-09-08 DIAGNOSIS — Z12.11 COLON CANCER SCREENING: ICD-10-CM

## 2022-09-08 DIAGNOSIS — Z11.59 NEED FOR HEPATITIS C SCREENING TEST: ICD-10-CM

## 2022-09-08 DIAGNOSIS — Z00.00 ANNUAL PHYSICAL EXAM: Primary | ICD-10-CM

## 2022-09-08 PROCEDURE — 99396 PREV VISIT EST AGE 40-64: CPT | Performed by: STUDENT IN AN ORGANIZED HEALTH CARE EDUCATION/TRAINING PROGRAM

## 2022-09-08 NOTE — PROGRESS NOTES
Ulises Keller D.O.  Internal Medicine  [facility]  [address], [address]  [place] [address]  [phone]    Chief Complaint  Annual checkup    HISTORY    Beronica Price is a 62 y.o. female who presents to the office today as a  an established patient for their annual preventative exam.    No hospitalization(s) within the last year.       Status of chronic medical conditions:    HLD: Not currently on medications    Post Menopausal symptoms: takes estradiol patch and medroxyprogesterone tablet, prescribed by Dr Reynaga    Left Breast cyst : follows with [facility] breast surgery , diagnostic mammograms and a left breast ultrasound ordered. Saw genetic counseling recently and had a negative ultrasound.    Patient's overall comments about their health or any other specific health concerns they report: none      Health Maintenance Summary          Overdue - TDAP/TD VACCINES (1 - Tdap) Overdue - never done    No completion history exists for this topic.          Overdue - ZOSTER VACCINE (1 of 2) Overdue - never done    No completion history exists for this topic.          Ordered - HEPATITIS C SCREENING (Once) Ordered on 9/8/2022    No completion history exists for this topic.          Ordered - COLORECTAL CANCER SCREENING (COLONOSCOPY - Every 10 Years) Ordered on 9/8/2022 02/08/2019  SCANNED - COLOGUARD          Overdue - COVID-19 Vaccine (3 - Booster for Pfizer series) Overdue since 11/16/2021 06/16/2021  Imm Admin: COVID-19 (PFIZER) PURPLE CAP    05/15/2021  Imm Admin: COVID-19 (PFIZER) PURPLE CAP          Overdue - LIPID PANEL (Yearly) Overdue since 9/8/2022 08/19/2021  Lipid Panel With LDL/HDL Ratio          INFLUENZA VACCINE (Yearly - October to March) Next due on 10/1/2022    10/05/2020  Imm Admin: Flu Vaccine Quad PF >36MO    01/31/2019  Imm Admin: Influenza, Unspecified    10/12/2017  Imm Admin: Flu Vaccine Quad PF >36MO    02/02/2017  Imm Admin: Influenza, Unspecified           Scheduled - MAMMOGRAM (Every 2 Years) Scheduled for 9/13/2022 07/21/2021  SCANNED - MAMMO    07/21/2021  SCANNED - MAMMO    07/21/2021  SCANNED - MAMMO    06/17/2021  SCANNED - MAMMO    06/01/2021  Patient-Reported (Performed Externally)    Only the first 5 history entries have been loaded, but more history exists.          ANNUAL PHYSICAL (Yearly) Next due on 9/9/2023 09/08/2022  Done    08/19/2021  Done    08/19/2021  Registry Metric: Last Annual Physical          PAP SMEAR (Every 3 Years) Next due on 6/1/2024 06/01/2021  Patient-Reported (Performed Externally)          Pneumococcal Vaccine 0-64 (Series Information) Aged Out    No completion history exists for this topic.                 Allergies   Allergen Reactions   • Gentamycin [Gentamicin] Hives   • Penicillins Hives        Outpatient Medications Marked as Taking for the 9/8/22 encounter (Office Visit) with Ulises Keller,    Medication Sig Dispense Refill   • estradiol (MINIVELLE, VIVELLE-DOT) 0.075 MG/24HR patch      • Magnesium 200 MG tablet Take 1 tablet by mouth.     • medroxyPROGESTERone (PROVERA) 2.5 MG tablet TK 1 T PO  D  11   • Omega-3 1000 MG capsule Take 1 capsule by mouth.     • thiamine (VITAMIN B-1) 100 MG tablet  tablet Take 100 mg by mouth Daily.     • vitamin D3 125 MCG (5000 UT) capsule capsule Take 5,000 Units by mouth Daily.          Past Medical History:   Diagnosis Date   • Bilateral breast cysts    • Dense breasts    • Hyperlipidemia    • Multiple thyroid nodules    • Transaminitis    • Uterine polyp      Past Surgical History:   Procedure Laterality Date   • ENDOMETRIAL ABLATION  2003   • MASTOIDECTOMY     • TONSILLECTOMY       Family History   Problem Relation Age of Onset   • Hypertension Mother    • Asthma Mother    • Atrial fibrillation Father    • Heart block Father    • Heart failure Father    • Multiple sclerosis Sister    • No Known Problems Sister    • No Known Problems Brother    • Coronary artery disease Brother 63  "  • Uterine cancer Maternal Grandmother 48   • Prostate cancer Maternal Grandfather    • Heart block Paternal Grandmother     reports that she has never smoked. She has never used smokeless tobacco. She reports current alcohol use of about 2.0 standard drinks of alcohol per week. She reports that she does not use drugs.    Immunization History   Administered Date(s) Administered   • COVID-19 (PFIZER) PURPLE CAP 05/15/2021, 06/16/2021   • Flu Vaccine Quad PF >36MO 10/12/2017, 10/05/2020   • Influenza, Unspecified 02/02/2017, 01/31/2019        OBJECTIVE    Vital Signs:   /60   Pulse 68   Temp 98 °F (36.7 °C) (Infrared)   Ht 171.5 cm (67.5\")   Wt 71.2 kg (157 lb)   SpO2 98%   BMI 24.23 kg/m²     Physical Exam  Vitals reviewed.   Constitutional:       General: She is not in acute distress.     Appearance: Normal appearance. She is normal weight. She is not ill-appearing.   HENT:      Head: Normocephalic and atraumatic.      Right Ear: Tympanic membrane, ear canal and external ear normal. There is no impacted cerumen.      Left Ear: Tympanic membrane, ear canal and external ear normal. There is no impacted cerumen.      Mouth/Throat:      Mouth: Mucous membranes are moist.      Pharynx: No oropharyngeal exudate or posterior oropharyngeal erythema.   Eyes:      General: No scleral icterus.     Extraocular Movements: Extraocular movements intact.      Conjunctiva/sclera: Conjunctivae normal.      Pupils: Pupils are equal, round, and reactive to light.   Cardiovascular:      Rate and Rhythm: Normal rate and regular rhythm.      Heart sounds: Normal heart sounds. No murmur heard.  Pulmonary:      Effort: Pulmonary effort is normal. No respiratory distress.      Breath sounds: Normal breath sounds. No wheezing.   Abdominal:      General: Bowel sounds are normal. There is no distension.      Palpations: Abdomen is soft.      Tenderness: There is no abdominal tenderness. There is no guarding.   Musculoskeletal:      " Cervical back: Neck supple. No tenderness.      Right lower leg: No edema.      Left lower leg: No edema.   Lymphadenopathy:      Cervical: No cervical adenopathy.   Skin:     General: Skin is warm and dry.      Coloration: Skin is not jaundiced.   Neurological:      General: No focal deficit present.      Mental Status: She is alert and oriented to person, place, and time.      Cranial Nerves: No cranial nerve deficit.      Motor: No weakness.   Psychiatric:         Mood and Affect: Mood normal.         Behavior: Behavior normal.         Thought Content: Thought content normal.                         ASSESSMENT & PLAN     #Annual Preventative Health Examination   -Age and sex appropriate physical exam performed and documented. Updated past medical, family, social and surgical histories as well as allergies and care team list. Addressed care gaps listed in the medical record.  -Encouraged seat belt use for every car ride for patient and all occupants.  Encouraged sunscreen use to reduce risk of skin cancer for any days with sun exposure over 20 minutes. Discussed the importance of smoke and carbon monoxide detectors in the home.   -Encouraged annual dental and vision exams as part of their overall health.  -Encouraged minimum of 30 minutes or more of exercise at a brisk walk or higher 5 days per week combined with a well-balanced diet.  -Immunizations reviewed and updated in EMR. Recommended Tetanus booster, Zoster vaccines, COVID 19 booster.     The 10-year ASCVD risk score ([place] BRAYAN Jr., et al., 2013) is: 2.5%    Values used to calculate the score:      Age: 62 years      Sex: Female      Is Non- : No      Diabetic: No      Tobacco smoker: No      Systolic Blood Pressure: 100 mmHg      Is BP treated: No      HDL Cholesterol: 58 mg/dL      Total Cholesterol: 178 mg/dL   -Lipid screening:  Patient is over age 40 and a 10-year ASCVD risk was calculated which does not indicate a need for statin  therapy.   -Aspirin for primary or secondary prevention: Not applicable, patient is greater than age 60 and risks outweigh benefits for primary prevention.  -Depression screening: PHQ2 performed and the patient's screen was negative.  -Diabetes screening:  A1c up to date and normal  -Tobacco use screening: Patient denies cigarette use. Tobacco counseling was was not indicated.  -Alcohol use screening: Patient reports drinking 0-2 drinks per week.. Alcohol abuse counseling was was not indicated.  -Illicit drug screening: Patient does not use illicit drugs.  -Hypertension screening: Patient screened negative for HTN today.  -HIV screening: Discussed with patient the importance of identifying asymptomatic HIV infection for adults in their age group with a one time screening test .Patient declined screening.   -Syphilis screening: Syphilis screening not indicated.  -Hepatitis B virus screening: Screening not indicated, not in a high-risk group.  -Hepatitis C virus screening:  Will screen today  -Colon cancer screening:  Negative cologuard 2/7/2019, repeat in 3 years   -Lung cancer screening: Patient has never smoked.  -Cervical cancer screening:  Informed patient that the USPSTF recommends screening for cervical cancer every 3 years with cervical cytology alone in women aged 21 to 29 years. For women aged 30 to 65 years, the USPSTF recommends screening every 3 years with cervical cytology alone, every 5 years with high-risk human papillomavirus (hrHPV) testing alone, or every 5 years with HPV testing in combination with cytology (cotesting). Lasp pap: was normal 5/7/2020  -Breast cancer screening: Informed patient that the USPSTF recommends biennial screening mammography for women aged 50 to 74 years. Pt has diagnostic mammogram ordered per Breast surgery.  -BRCA related cancer screening: pt has seen genetic counseling and had negative genetic screen  -Osteoporosis screening: Informed patient that the USPSTF recommends  screening for osteoporosis with bone measurement testing to prevent osteoporotic fractures in women 65 years and older. Pt states she has already had bone density scan with GYN and is normal.     Follow up in 1 year for annual physical exam.    Patient/family had no further questions at this time and verbalized understanding of the plan discussed today.

## 2022-09-09 LAB
ALBUMIN SERPL-MCNC: 4.8 G/DL (ref 3.8–4.8)
ALBUMIN/GLOB SERPL: 2.4 {RATIO} (ref 1.2–2.2)
ALP SERPL-CCNC: 81 IU/L (ref 44–121)
ALT SERPL-CCNC: 23 IU/L (ref 0–32)
AST SERPL-CCNC: 19 IU/L (ref 0–40)
BASOPHILS # BLD AUTO: 0 X10E3/UL (ref 0–0.2)
BASOPHILS NFR BLD AUTO: 1 %
BILIRUB SERPL-MCNC: 0.4 MG/DL (ref 0–1.2)
BUN SERPL-MCNC: 13 MG/DL (ref 8–27)
BUN/CREAT SERPL: 17 (ref 12–28)
CALCIUM SERPL-MCNC: 10.1 MG/DL (ref 8.7–10.3)
CHLORIDE SERPL-SCNC: 102 MMOL/L (ref 96–106)
CO2 SERPL-SCNC: 23 MMOL/L (ref 20–29)
CREAT SERPL-MCNC: 0.75 MG/DL (ref 0.57–1)
EGFRCR-CYS SERPLBLD CKD-EPI 2021: 89 ML/MIN/1.73
EOSINOPHIL # BLD AUTO: 0.1 X10E3/UL (ref 0–0.4)
EOSINOPHIL NFR BLD AUTO: 1 %
ERYTHROCYTE [DISTWIDTH] IN BLOOD BY AUTOMATED COUNT: 12 % (ref 11.7–15.4)
GLOBULIN SER CALC-MCNC: 2 G/DL (ref 1.5–4.5)
GLUCOSE SERPL-MCNC: 81 MG/DL (ref 65–99)
HCT VFR BLD AUTO: 39.9 % (ref 34–46.6)
HCV AB S/CO SERPL IA: <0.1 S/CO RATIO (ref 0–0.9)
HGB BLD-MCNC: 13.9 G/DL (ref 11.1–15.9)
IMM GRANULOCYTES # BLD AUTO: 0 X10E3/UL (ref 0–0.1)
IMM GRANULOCYTES NFR BLD AUTO: 0 %
LYMPHOCYTES # BLD AUTO: 2.1 X10E3/UL (ref 0.7–3.1)
LYMPHOCYTES NFR BLD AUTO: 25 %
MCH RBC QN AUTO: 32 PG (ref 26.6–33)
MCHC RBC AUTO-ENTMCNC: 34.8 G/DL (ref 31.5–35.7)
MCV RBC AUTO: 92 FL (ref 79–97)
MONOCYTES # BLD AUTO: 0.6 X10E3/UL (ref 0.1–0.9)
MONOCYTES NFR BLD AUTO: 7 %
NEUTROPHILS # BLD AUTO: 5.8 X10E3/UL (ref 1.4–7)
NEUTROPHILS NFR BLD AUTO: 66 %
PLATELET # BLD AUTO: 294 X10E3/UL (ref 150–450)
POTASSIUM SERPL-SCNC: 4.5 MMOL/L (ref 3.5–5.2)
PROT SERPL-MCNC: 6.8 G/DL (ref 6–8.5)
RBC # BLD AUTO: 4.35 X10E6/UL (ref 3.77–5.28)
SODIUM SERPL-SCNC: 143 MMOL/L (ref 134–144)
WBC # BLD AUTO: 8.7 X10E3/UL (ref 3.4–10.8)

## 2022-09-13 ENCOUNTER — HOSPITAL ENCOUNTER (OUTPATIENT)
Dept: ULTRASOUND IMAGING | Facility: HOSPITAL | Age: 62
Discharge: HOME | End: 2022-09-13

## 2022-09-13 ENCOUNTER — HOSPITAL ENCOUNTER (OUTPATIENT)
Dept: MAMMOGRAPHY | Facility: HOSPITAL | Age: 62
Discharge: HOME | End: 2022-09-13

## 2022-09-13 DIAGNOSIS — Z91.89 AT HIGH RISK FOR BREAST CANCER: ICD-10-CM

## 2022-09-13 DIAGNOSIS — N63.21 MASS OF UPPER OUTER QUADRANT OF LEFT BREAST: ICD-10-CM

## 2022-09-13 PROCEDURE — G0279 TOMOSYNTHESIS, MAMMO: HCPCS

## 2022-09-13 PROCEDURE — 77066 DX MAMMO INCL CAD BI: CPT

## 2022-09-13 PROCEDURE — 76642 ULTRASOUND BREAST LIMITED: CPT

## 2022-09-14 ENCOUNTER — TELEPHONE (OUTPATIENT)
Dept: MAMMOGRAPHY | Facility: CLINIC | Age: 62
End: 2022-09-14

## 2022-09-14 NOTE — TELEPHONE ENCOUNTER
I left a message telling her that the mammograms looked okay.  The follow-up left breast ultrasound failed to demonstrate the previously seen area which is all good.  She does have an MRI in 6 months and I told her that I would like to see her in the office after the MRI.  I also asked her to call me if she had any questions.

## 2023-03-23 ENCOUNTER — HOSPITAL ENCOUNTER (OUTPATIENT)
Dept: MRI IMAGING | Facility: HOSPITAL | Age: 63
Discharge: HOME | End: 2023-03-23
Admitting: SURGERY
Payer: COMMERCIAL

## 2023-03-23 DIAGNOSIS — Z91.89 AT HIGH RISK FOR BREAST CANCER: ICD-10-CM

## 2023-03-23 PROCEDURE — A9577 INJ MULTIHANCE: HCPCS | Performed by: SURGERY

## 2023-03-23 PROCEDURE — 0 GADOBENATE DIMEGLUMINE 529 MG/ML SOLUTION: Performed by: SURGERY

## 2023-03-23 PROCEDURE — 77049 MRI BREAST C-+ W/CAD BI: CPT

## 2023-03-23 PROCEDURE — 82565 ASSAY OF CREATININE: CPT

## 2023-03-23 RX ADMIN — GADOBENATE DIMEGLUMINE 14 ML: 529 INJECTION, SOLUTION INTRAVENOUS at 14:32

## 2023-03-24 LAB — CREAT BLDA-MCNC: 0.7 MG/DL (ref 0.6–1.3)

## 2023-03-27 ENCOUNTER — TELEPHONE (OUTPATIENT)
Dept: MAMMOGRAPHY | Facility: CLINIC | Age: 63
End: 2023-03-27
Payer: COMMERCIAL

## 2023-03-27 NOTE — TELEPHONE ENCOUNTER
I left a message today stating that the recent MRI did not show any concerning findings for malignancy in either breast.  She was due to come into the office after her MRI so I asked her to schedule an appointment at her earliest convenience.  I also told her to call if she had any questions.

## 2023-06-07 ENCOUNTER — OFFICE VISIT (OUTPATIENT)
Dept: MAMMOGRAPHY | Facility: CLINIC | Age: 63
End: 2023-06-07
Payer: COMMERCIAL

## 2023-06-07 VITALS
SYSTOLIC BLOOD PRESSURE: 119 MMHG | DIASTOLIC BLOOD PRESSURE: 73 MMHG | OXYGEN SATURATION: 97 % | HEART RATE: 62 BPM | WEIGHT: 160 LBS | HEIGHT: 68 IN | BODY MASS INDEX: 24.25 KG/M2

## 2023-06-07 DIAGNOSIS — Z91.89 AT HIGH RISK FOR BREAST CANCER: Primary | ICD-10-CM

## 2023-06-07 NOTE — PROGRESS NOTES
Subjective   Beronica Price is a 62 y.o. female     History of Present Illness   She is a nice 62-year-old white female that I have been following because of an elevated risk for breast cancer.  Previous risk assessment has estimated her lifetime risk between 20 and 30%.  Her 5-year risk is slightly elevated at 2.6%.  She has not desired hormone blocking therapy up to this point.  After her last visit mammograms and an ultrasound were obtained to evaluate her previously noted abnormality in the left breast.  That all came back clear.  I have personally reviewed the mammogram and ultrasound and agree that there are no suspicious masses, worrisome microcalcifications, or areas of architectural distortion.  In March of this year an MRI was performed and there was no MRI evidence of malignancy.      Review of Systems constitutional-no unusual changes in weight or appetite.  Past Medical History:   Diagnosis Date    Bilateral breast cysts     Dense breasts     Hyperlipidemia     Multiple thyroid nodules     Transaminitis     Uterine polyp      Past Surgical History:   Procedure Laterality Date    ENDOMETRIAL ABLATION  2003    MASTOIDECTOMY      TONSILLECTOMY       Family History   Problem Relation Age of Onset    Hypertension Mother     Asthma Mother     Atrial fibrillation Father     Heart block Father     Heart failure Father     Multiple sclerosis Sister     No Known Problems Sister     No Known Problems Brother     Coronary artery disease Brother 63    Uterine cancer Maternal Grandmother 48    Prostate cancer Maternal Grandfather     Heart block Paternal Grandmother      Social History     Socioeconomic History    Marital status:    Tobacco Use    Smoking status: Never    Smokeless tobacco: Never   Vaping Use    Vaping Use: Never used   Substance and Sexual Activity    Alcohol use: Yes     Alcohol/week: 2.0 standard drinks     Types: 2 Glasses of wine per week     Comment: sometimes she doesn't drink at all for  months    Drug use: Never       Objective   Physical Exam  Institutional-BMI 24.3, no acute distress  Left breast-medium size and symmetrical.  There are no changes to the skin of the breast.  With the arms raised and the pectoralis muscle contracted there is no skin dimpling or nipple retraction.  There is also no nipple discharge.  The breast tissue is very dense in the central portion of the breast but there are no dominant masses detected.  Right breast-medium size and symmetrical.  The skin of the breast shows no abnormalities.  I could not elicit any nipple discharge.  With the arms raised and the pectoralis muscle contracted there is no skin dimpling or nipple retraction.  She has a similar pattern of fibrocystic nodularity in the central portion of the breast but again no dominant masses.  Lymphatics-no cervical or axillary adenopathy.    Assessment & Plan   At high risk for breast cancer.  Her imaging is up-to-date and looks just fine.  I do not detect anything concerning on physical examination either.  Her next mammograms are due in October.  I plan to order an MRI for March 2024.  If the imaging continues to look good, I will see her back in the office in 1 year.      The encounter diagnosis was At high risk for breast cancer.

## 2024-04-10 ENCOUNTER — TELEPHONE (OUTPATIENT)
Dept: MAMMOGRAPHY | Facility: CLINIC | Age: 64
End: 2024-04-10
Payer: COMMERCIAL

## 2024-04-10 ENCOUNTER — OFFICE VISIT (OUTPATIENT)
Dept: INTERNAL MEDICINE | Facility: CLINIC | Age: 64
End: 2024-04-10
Payer: COMMERCIAL

## 2024-04-10 VITALS
HEART RATE: 72 BPM | SYSTOLIC BLOOD PRESSURE: 98 MMHG | HEIGHT: 68 IN | OXYGEN SATURATION: 98 % | TEMPERATURE: 98.4 F | WEIGHT: 159 LBS | BODY MASS INDEX: 24.1 KG/M2 | DIASTOLIC BLOOD PRESSURE: 70 MMHG

## 2024-04-10 DIAGNOSIS — J20.9 ACUTE BRONCHITIS, UNSPECIFIED ORGANISM: Primary | ICD-10-CM

## 2024-04-10 PROCEDURE — 99213 OFFICE O/P EST LOW 20 MIN: CPT | Performed by: STUDENT IN AN ORGANIZED HEALTH CARE EDUCATION/TRAINING PROGRAM

## 2024-04-10 RX ORDER — AZITHROMYCIN 250 MG/1
TABLET, FILM COATED ORAL
Qty: 6 TABLET | Refills: 0 | Status: SHIPPED | OUTPATIENT
Start: 2024-04-10

## 2024-04-10 RX ORDER — ALBUTEROL SULFATE 90 UG/1
2 AEROSOL, METERED RESPIRATORY (INHALATION) EVERY 4 HOURS PRN
Qty: 18 G | Refills: 0 | Status: SHIPPED | OUTPATIENT
Start: 2024-04-10

## 2024-04-10 NOTE — PROGRESS NOTES
"  Ulises Keller D.O.  Internal Medicine  [facility]  [address], [address]  [place] [address]  [phone]      Chief Complaint  Cough (Ear feels full)    SUBJECTIVE    History of Present Illness    Beronica Price is a 63 y.o. female who presents to the office today as an established patient that last saw me on 9/8/2022.   Here for acute care visit.     Pt states Jan 4 2024 she felt she got sick with a virus.  8 days later she started coughing \"violently, couldn't sleep at night\". Into February she went to an immediate care and was diagnosed with postviral cough syndrome and got some cough medication and prednisone Rx. She took the cough medication but didn't immediately take the prednisone because she wanted to give her body a chance to improve on its own. On the first week of April she took the prednisone because she had residual symptom of ear fullness, decreased hearing.   States she finished steroid last week . States last weekend she developed a fever and stomach virus symptoms with headache, chills, nausea. No diarrhea. She is feeling better from the stomach symptoms but still dealing with cough that is productive dark yellow sputum.   States she feels like she is having an asthma issue like she had in childhood. She has been using nasacort and netti pot at home. She has taken Zyrtec 5 or 6 days over the course of her illness.     Allergies   Allergen Reactions    Gentamycin [Gentamicin] Hives    Penicillins Hives        Outpatient Medications Marked as Taking for the 4/10/24 encounter (Office Visit) with Ulises Keller, DO   Medication Sig Dispense Refill    estradiol (MINIVELLE, VIVELLE-DOT) 0.075 MG/24HR patch       Magnesium 200 MG tablet Take 1 tablet by mouth.      medroxyPROGESTERone (PROVERA) 2.5 MG tablet TK 1 T PO  D  11    Omega-3 1000 MG capsule Take 1 capsule by mouth.      vitamin D3 125 MCG (5000 UT) capsule capsule Take 1 capsule by mouth Daily.          Past Medical " "History:   Diagnosis Date    Bilateral breast cysts     Dense breasts     Hyperlipidemia     Multiple thyroid nodules     Transaminitis     Uterine polyp        OBJECTIVE    Vital Signs:   BP 98/70   Pulse 72   Temp 98.4 °F (36.9 °C) (Oral)   Ht 172.7 cm (68\")   Wt 72.1 kg (159 lb)   SpO2 98%   BMI 24.18 kg/m²        Physical Exam  Vitals reviewed.   Constitutional:       General: She is not in acute distress.     Appearance: Normal appearance. She is not ill-appearing.   HENT:      Right Ear: Tympanic membrane, ear canal and external ear normal. There is no impacted cerumen.      Left Ear: Tympanic membrane, ear canal and external ear normal. There is no impacted cerumen.      Mouth/Throat:      Mouth: Mucous membranes are moist.      Pharynx: Oropharynx is clear. Posterior oropharyngeal erythema (posterior pharynx) present. No oropharyngeal exudate.   Eyes:      General: No scleral icterus.  Cardiovascular:      Rate and Rhythm: Normal rate and regular rhythm.      Heart sounds: Normal heart sounds. No murmur heard.  Pulmonary:      Effort: Pulmonary effort is normal. No respiratory distress.      Breath sounds: Normal breath sounds. No wheezing.   Lymphadenopathy:      Cervical: Cervical adenopathy (anterior cervical b/l) present.   Skin:     Coloration: Skin is not jaundiced.   Neurological:      Mental Status: She is alert.   Psychiatric:         Mood and Affect: Mood normal.         Behavior: Behavior normal.         Thought Content: Thought content normal.                             ASSESSMENT & PLAN     Diagnoses and all orders for this visit:    1. Acute bronchitis, unspecified organism (Primary)  -pt with signs and symptoms of bronchitis after viral URI symptoms several months ago. Cough and sputum production as well as nasal congestion and eustachian tube dysfunction are present. On exam she is slightly hypotensive, afebrile, no acute distress. She has a lower BP chronically. Lungs are clear. She " has some pharynx erythema and cervical FRANTZ. Recommend she add in Afrin nasal spray (3 days max) and allegra/zyrtec/claritin + D over the counter for symptomatic relief. Will Rx an albuterol inhaler for any reactive airway component to her symptoms and given nature and duration of her cough and sputum will treat empirically with Z delfino. If no improvement in 1 week I would like her to return to office for chest imaging. She is agreeable.   -     albuterol sulfate  (90 Base) MCG/ACT inhaler; Inhale 2 puffs Every 4 (Four) Hours As Needed for Wheezing.  Dispense: 18 g; Refill: 0  -     azithromycin (Zithromax Z-Delfino) 250 MG tablet; Take 2 tablets by mouth on day 1, then 1 tablet daily on days 2-5  Dispense: 6 tablet; Refill: 0            Follow Up  No follow-ups on file.    Patient/family had no further questions at this time and verbalized understanding of the plan discussed today.

## 2024-04-23 DIAGNOSIS — J20.9 ACUTE BRONCHITIS, UNSPECIFIED ORGANISM: ICD-10-CM

## 2024-04-23 RX ORDER — ALBUTEROL SULFATE 90 UG/1
2 AEROSOL, METERED RESPIRATORY (INHALATION) EVERY 4 HOURS PRN
Qty: 18 G | Refills: 0 | OUTPATIENT
Start: 2024-04-23

## 2024-04-29 ENCOUNTER — DOCUMENTATION (OUTPATIENT)
Dept: MAMMOGRAPHY | Facility: CLINIC | Age: 64
End: 2024-04-29
Payer: COMMERCIAL

## 2024-04-29 NOTE — PROGRESS NOTES
I left a message stating that the recent MRI looked fine at [facility].  There was a lot of background enhancement but not much in the way of change or anything suspicious.  She is due to see me in the office in June.  I asked her to call if she had any questions.

## 2024-06-05 ENCOUNTER — OFFICE VISIT (OUTPATIENT)
Dept: MAMMOGRAPHY | Facility: CLINIC | Age: 64
End: 2024-06-05
Payer: COMMERCIAL

## 2024-06-05 VITALS
HEART RATE: 68 BPM | HEIGHT: 68 IN | WEIGHT: 155 LBS | DIASTOLIC BLOOD PRESSURE: 75 MMHG | BODY MASS INDEX: 23.49 KG/M2 | OXYGEN SATURATION: 97 % | SYSTOLIC BLOOD PRESSURE: 115 MMHG

## 2024-06-05 DIAGNOSIS — Z91.89 AT HIGH RISK FOR BREAST CANCER: Primary | ICD-10-CM

## 2024-06-05 NOTE — PROGRESS NOTES
Subjective   Beronica Price is a 63 y.o. female     History of Present Illness   She is a nice 63-year-old white female who is felt to be at high risk for breast cancer.  Her family history has changed because her sister was recently diagnosed with a stage I invasive ductal cancer.  The patient continues to take hormone replacement therapy.    Her imaging is in good order.  An MRI was performed in April 2024.  Those images were independently reviewed.  There was no suspicious masslike or non-mass like enhancement detected but there was a lot of background enhancement likely related to her dense breast tissue.  In September 2023 she had mammograms performed and those images have been personally reviewed along with the reports.  She does have heterogeneously dense breast tissue but no suspicious masses, areas of architectural distortion or suspicious microcalcifications.  There are a few scattered benign-appearing calcifications that are not clustered.  She also has a clip in the upper outer quadrant of the left breast without any surrounding abnormalities.    The patient has not palpated anything of concern.    Review of Systems constitutional-no unusual changes in weight or appetite.  Past Medical History:   Diagnosis Date    Bilateral breast cysts     Dense breasts     Hyperlipidemia     Multiple thyroid nodules     Transaminitis     Uterine polyp      Past Surgical History:   Procedure Laterality Date    ENDOMETRIAL ABLATION  2003    MASTOIDECTOMY      TONSILLECTOMY       Family History   Problem Relation Age of Onset    Hypertension Mother     Asthma Mother     Atrial fibrillation Father     Heart block Father     Heart failure Father     Multiple sclerosis Sister     No Known Problems Sister     No Known Problems Brother     Coronary artery disease Brother 63    Uterine cancer Maternal Grandmother 48    Prostate cancer Maternal Grandfather     Heart block Paternal Grandmother      Social History     Socioeconomic  History    Marital status:    Tobacco Use    Smoking status: Never    Smokeless tobacco: Never   Vaping Use    Vaping status: Never Used   Substance and Sexual Activity    Alcohol use: Yes     Alcohol/week: 2.0 standard drinks of alcohol     Types: 2 Glasses of wine per week     Comment: sometimes she doesn't drink at all for months    Drug use: Never       Objective   Physical Exam  BMI is within normal parameters. No other follow-up for BMI required.  Right breast-medium size and symmetrical.  The skin of the breast looks normal and there is no nipple discharge.  With the arms raised and the pectoralis muscle contracted, there was no skin dimpling or nipple retraction.  The breast tissue is very dense particularly in the upper outer quadrant with some caking to the tissue making examination a bit difficult.  Left breast-medium size and symmetrical without skin changes.  I could not express any nipple discharge.  When the arms were maneuvered, there was no skin dimpling or nipple retraction.  There is a similar pattern of very dense nodular breast tissue in the upper outer quadrant making examination a bit difficult.  Lymphatics-no cervical or axillary adenopathy.    Assessment & Plan   At high risk for breast cancer-we have recalculated her risk assessment.  The lifetime risk is between 28 and 41%.  Her 5-year risk has increased to 3.9%.  She would like to continue with supplemental MRI imaging in addition to yearly 3D mammography.  We had a nice discussion regarding chemoprevention because of her elevated 5-year risk.  She is on hormone replacement therapy and would need to come off of that prior to giving chemoprevention.  I talked about tamoxifen and aromatase inhibitors and their side effects.  She was also given literature on both of those medications.  She will do some research and talk with Dr. Reynaga about perhaps weaning off of the hormone replacement therapy.  Orders were placed for her next MRI  and I would like to see her back in the office in 1 year.    The encounter diagnosis was At high risk for breast cancer.

## 2024-06-05 NOTE — LETTER
June 5, 2024     Shabana Reynaga MD  [address]  [address]  [facility] [address]    Patient: Beronica Price   YOB: 1960   Date of Visit: 6/5/2024     Dear Shabana Reynaga MD:       Thank you for referring Beronica Price to me for evaluation. Below are the relevant portions of my assessment and plan of care.    If you have questions, please do not hesitate to call me. I look forward to following Beronica along with you.         Sincerely,        Jeffrey Villarreal MD        CC: DO Cherelle Oden William P, MD  06/05/24 1148  Sign when Signing Visit  Subjective  Beronica Price is a 63 y.o. female     History of Present Illness   She is a nice 63-year-old white female who is felt to be at high risk for breast cancer.  Her family history has changed because her sister was recently diagnosed with a stage I invasive ductal cancer.  The patient continues to take hormone replacement therapy.    Her imaging is in good order.  An MRI was performed in April 2024.  Those images were independently reviewed.  There was no suspicious masslike or non-mass like enhancement detected but there was a lot of background enhancement likely related to her dense breast tissue.  In September 2023 she had mammograms performed and those images have been personally reviewed along with the reports.  She does have heterogeneously dense breast tissue but no suspicious masses, areas of architectural distortion or suspicious microcalcifications.  There are a few scattered benign-appearing calcifications that are not clustered.  She also has a clip in the upper outer quadrant of the left breast without any surrounding abnormalities.    The patient has not palpated anything of concern.    Review of Systems constitutional-no unusual changes in weight or appetite.  Past Medical History:   Diagnosis Date   • Bilateral breast cysts    • Dense breasts    • Hyperlipidemia    • Multiple thyroid nodules    • Transaminitis    • Uterine polyp       Past Surgical History:   Procedure Laterality Date   • ENDOMETRIAL ABLATION  2003   • MASTOIDECTOMY     • TONSILLECTOMY       Family History   Problem Relation Age of Onset   • Hypertension Mother    • Asthma Mother    • Atrial fibrillation Father    • Heart block Father    • Heart failure Father    • Multiple sclerosis Sister    • No Known Problems Sister    • No Known Problems Brother    • Coronary artery disease Brother 63   • Uterine cancer Maternal Grandmother 48   • Prostate cancer Maternal Grandfather    • Heart block Paternal Grandmother      Social History     Socioeconomic History   • Marital status:    Tobacco Use   • Smoking status: Never   • Smokeless tobacco: Never   Vaping Use   • Vaping status: Never Used   Substance and Sexual Activity   • Alcohol use: Yes     Alcohol/week: 2.0 standard drinks of alcohol     Types: 2 Glasses of wine per week     Comment: sometimes she doesn't drink at all for months   • Drug use: Never       Objective  Physical Exam  BMI is within normal parameters. No other follow-up for BMI required.  Right breast-medium size and symmetrical.  The skin of the breast looks normal and there is no nipple discharge.  With the arms raised and the pectoralis muscle contracted, there was no skin dimpling or nipple retraction.  The breast tissue is very dense particularly in the upper outer quadrant with some caking to the tissue making examination a bit difficult.  Left breast-medium size and symmetrical without skin changes.  I could not express any nipple discharge.  When the arms were maneuvered, there was no skin dimpling or nipple retraction.  There is a similar pattern of very dense nodular breast tissue in the upper outer quadrant making examination a bit difficult.  Lymphatics-no cervical or axillary adenopathy.    Assessment & Plan  At high risk for breast cancer-we have recalculated her risk assessment.  The lifetime risk is between 28 and 41%.  Her 5-year risk has  increased to 3.9%.  She would like to continue with supplemental MRI imaging in addition to yearly 3D mammography.  We had a nice discussion regarding chemoprevention because of her elevated 5-year risk.  She is on hormone replacement therapy and would need to come off of that prior to giving chemoprevention.  I talked about tamoxifen and aromatase inhibitors and their side effects.  She was also given literature on both of those medications.  She will do some research and talk with Dr. Reynaga about perhaps weaning off of the hormone replacement therapy.  Orders were placed for her next MRI and I would like to see her back in the office in 1 year.    The encounter diagnosis was At high risk for breast cancer.

## 2024-10-28 ENCOUNTER — TELEPHONE (OUTPATIENT)
Dept: INTERNAL MEDICINE | Facility: CLINIC | Age: 64
End: 2024-10-28
Payer: COMMERCIAL

## 2024-10-28 NOTE — TELEPHONE ENCOUNTER
Caller: Beronica Price    Relationship: Self    Best call back number: [phone]     What is the medical concern/diagnosis: FELL AND HIT HEAD 2 WEEKS AGO    What specialty or service is being requested: CAT SCAN    Any additional details: PATIENT CONCERNED ABOUT A SLOW BLEED

## 2025-01-28 ENCOUNTER — TELEPHONE (OUTPATIENT)
Dept: MAMMOGRAPHY | Facility: CLINIC | Age: 65
End: 2025-01-28
Payer: COMMERCIAL

## 2025-01-28 NOTE — TELEPHONE ENCOUNTER
LM for pt to call about her Mri of bilat br scheduled at [facility] on 4-30-25 arrive 8:30 for 9am Mri. Also need to move her appt to [facility].

## 2025-02-25 ENCOUNTER — TELEPHONE (OUTPATIENT)
Dept: SURGERY | Facility: CLINIC | Age: 65
End: 2025-02-25
Payer: COMMERCIAL

## 2025-02-25 ENCOUNTER — TELEPHONE (OUTPATIENT)
Dept: MAMMOGRAPHY | Facility: CLINIC | Age: 65
End: 2025-02-25
Payer: COMMERCIAL

## 2025-02-25 NOTE — TELEPHONE ENCOUNTER
Redid her TC8 and she has a score of 20.1.  She called the office today to see if she could have an MRI without contrast.  I have advised her that there is no reason to do a breast MRI without contrast so I will follow-up with her in June at her already set appointment to discuss her risk score that has decreased and how we will proceed further with high rescreening

## 2025-02-25 NOTE — TELEPHONE ENCOUNTER
Lm to let pt know [facility] will not do Mri of bilat br without contrast.  The only place I know is [facility]  I ask pt to call me back so I could send her info to [facility] and cancel her appt with [facility]

## 2025-02-25 NOTE — TELEPHONE ENCOUNTER
Talked with pt about Mri of bilat br without contrast. [facility] would need to put order in and does not think having Mri without contrast would be helpful. So pt is to see Liliya in June and they will go over things then. Pt was ok with this plan.

## 2025-04-25 ENCOUNTER — HOSPITAL ENCOUNTER (OUTPATIENT)
Dept: MRI IMAGING | Facility: HOSPITAL | Age: 65
Discharge: HOME | End: 2025-04-25
Admitting: SURGERY
Payer: COMMERCIAL

## 2025-04-25 DIAGNOSIS — Z91.89 AT HIGH RISK FOR BREAST CANCER: ICD-10-CM

## 2025-04-25 PROCEDURE — 77049 MRI BREAST C-+ W/CAD BI: CPT

## 2025-04-25 PROCEDURE — 25510000002 GADOBENATE DIMEGLUMINE 529 MG/ML SOLUTION: Performed by: STUDENT IN AN ORGANIZED HEALTH CARE EDUCATION/TRAINING PROGRAM

## 2025-04-25 PROCEDURE — A9577 INJ MULTIHANCE: HCPCS | Performed by: STUDENT IN AN ORGANIZED HEALTH CARE EDUCATION/TRAINING PROGRAM

## 2025-04-25 RX ADMIN — GADOBENATE DIMEGLUMINE 14 ML: 529 INJECTION, SOLUTION INTRAVENOUS at 16:56

## 2025-04-28 ENCOUNTER — RESULTS FOLLOW-UP (OUTPATIENT)
Dept: MAMMOGRAPHY | Facility: CLINIC | Age: 65
End: 2025-04-28
Payer: COMMERCIAL

## 2025-04-28 NOTE — TELEPHONE ENCOUNTER
Adarsh Fairchild

 Created on:2018



Patient:Adarsh Fairchild

Sex:Female

:1960

External Reference #:[id number]





Demographics







 Address  [address] [place] [address]

 

 Home Phone  [phone]

 

 Mobile Phone  [phone]

 

 Work Phone  [phone]

 

 Email Address  [email or web address]

 

 Preferred Language  English

 

 Marital Status  Not  Or 

 

 Church Affiliation  Unknown

 

 Race  White

 

 Ethnic Group  Not  Or 









Author







 Organization  [facility]

 

 Address  [address] [address]

 

 Phone  [phone]









Support







 Name  Relationship  Address  Phone

 

 Alexei Fairchild  Unavailable  [phone]









Care Team Providers







 Name  Role  Phone

 

 Mali Antonio MD  Primary Care Physician  Unavailable









Payers







 Type  Date  Identification Numbers  Payment Provider  Subscriber

 

 Commercial  Effective:  Policy Number: [id number]  BS [employer]ets  Adarsh Fairchild



   2012      









 PayID: [id number]  [address] [address]









 [place] [address]









 Workers Compensation  Expires:  Policy Number:  TST Workers  Adarsh Fairchild



   2012  [id number]  Comp  









 Onset: 2011  PayID: [id number]  [address]









 [place] [address]









 Medigap Part B  Effective: 2010  Policy Number:  [place]  Adarsh Fairchild



     [id number]    









 Expires: 2011  PayID: [id number]  [address] [address]









 [place] [address]









 Workers Compensation  Expires: 2012  Policy Number:  TSTWC  Adarsh Fairchild



     [id number]    









 Onset: 2011  PayID: [id number]  [address] [place] [address]







Problems







 Date  Description  Provider  Status

 

 Onset: 2011  Benign essential hypertension  Radha Jackson N.MAI  Active

 

 Onset: 2013  Ankylosing spondylitis  Honorio Shah M.D.  Active

 

 Onset: 2013  Spinal stenosis of lumbar region  Honorio Shah M.D.  
Active

 

 Onset: 2014  Strain of rotator cuff capsule  Honorio Shah M.D.  Active

 

 Onset: 2014  Chronic pharyngitis  Honorio Shah M.D.  Active

 

 Onset: 2014  Hyperlipidemia  Honorio Shah M.D.  Active

 

 Onset: 2014  Cervical spondylosis without  Honorio Shah M.D.  Active



   myelopathy    

 

 Onset: 2015  Chronic pain syndrome  Honorio Shah M.D.  Active

 

 Onset: 2016  Anxiety  Mali Antonio M.D.  Active

 

 Onset: 2017  Lumbosacral stenosis  Oswald Perez, FNP  Active

 

 Onset: 2017  Localized, secondary osteoarthritis  Laure Jeter M.D.  
Active

 

 Onset: 2017  Isolated cervical dystonia  Mali Antonio M.D.  Active







Family History







 Date  Family Member(s)  Problem(s)  Comments

 

   General  heart trouble  

 

   General  diabetes  

 

 :  (age 82  Father   due to Heart  heart aneurysm, CEA, also



 Years)    Disease  ankylosing spondylitis



       and many other problems

 

   Mother  Diabetes  

 

 :  (age 61  First Brother   due to  



 Years)    Castleman's disease  

 

   First Sister  Alive And Well  







Social History







 Type  Date  Description  Comments

 

 Marital Status      

 

 Occupation    Teacher  [employer], special



       education

 

 Occupation    Retired  doing some substituting,



       tutoring

 

 Cigarette Use    Never Smoked Cigarettes  

 

 ETOH Use    Denies alcohol use  

 

 Smoking    Patient has never smoked  

 

 Exercise Type/Frequency    Exercises regularly  







Allergies, Adverse Reactions, Alerts







 Date  Description  Reaction  Status  Severity  Comments

 

 2010  Biaxin  Urticaria  active  Moderate to  



         Severe  

 

 10/07/2014  Corn  Anaphylaxis,  active  Moderate to  



     Urticaria    Severe  

 

 07/10/2015  Envioromental  Allergic asthma  active  Severe  

 

 2017  Acetaminophen / Codeine    active    







Medications







 Medication  Date  Status  Form  Strength  Qnty  SIG  Indications  Ordering



                 Provider

 

 Buspirone HCL    Active  Tablets  5mg  60tab  1 by    Paco



           s  mouth    Leodan, NP



             twice a    



             day    

 

 Hydrocodone-Aceta    Active  Tablets  5-325mg  90tab  1 tab by    Laure alfaro          s  mouth    Corona,



             every 6    M.D.



             hours as    



             needed    



             for pain    

 

 Montelukast  07/10  Active  Tablets  10mg  90tab  1 tab by  T78.40xA  Zsofia



 Sodium          s  mouth    Chris,



             every    FNP



             day.    

 

 Epipen 2-Ranjeet  10/07  Active  Solution  0.3mg/0.3  2apk    995.3      Auto-Inject  ML        Endo, M.D.

 

 Venlafaxine HCL    Active  Caps ER  150mg  60cap  take 1        24HR    s  capsule    Cotton,



             by mouth    M.D.



             twice    



             daily    

 

 Trazodone HCL    Active  Tablets  50mg  30tab  take 1            s  tablet by    Cotton,



             mouth    M.D.



             nightly    



             at    



             bedtime    



             as needed    

 

 Propranolol HCL    Active  Tablets  20mg  180ta  1 by    Mali        bs  mouth    Cotton,



             twice a    M.D.



             day    

 

 Clonazepam    Active  Tablets  0.5mg  60tab  Take 1/2    Mali        s  Tablet By    Cotton,



             Mouth In    M.D.



             The    



             Morning    



             And In    



             The    



             Afternoon    



             , And 1    



             Tablet At    



             Night    



             *Max    



             2/Day*    

 

 Dymista    Active  Suspension  137-50mcg    2 spray  461.8  Unknown



   /0000      /Act    each    



             nostril    



             bid    

 

 Dulera    Active  Aerosol  200-5mcg/        Danielewic



   /0000      Act        z,



                 Delaney,



                 SERJIO

 

 Pazeo    Active  Solution  0.7%        Unknown



   /0000              

 

 Ventolin HFA    Active  Aerosol  108(90Bas        Unknown



   /0000      e)        



         mcg/Act        

 

 Oxybutynin    Active  Tablets  5mg    1 PO qd    Unknown



 Chloride  /0000              

 

 Cemill/Bioflavono    Active  Tablets      take 1    Unknown



 ids  /          tab daily    

 

 Calcium 500+D    Active  Tablets  500-200mg    1 by    Unknown



   /0000      -Unit    mouth    



             twice a    



             day    

 

 B-Stress    Active  Capsules          Unknown



   /0000              

 

 Fish Oil    Active  Capsules  1000mg    1 by    Unknown



   /0000          mouth    



             every day    

 

 Clarinex    Active  Tablets  5mg    once    Unknown



   /0000          daily    

 

                 

 

 Cephalexin    Hx  Tablets  500mg  20tab  1 by            s  mouth    Corona,



   -          four    M.D.



             times           day for 5    



             days    

 

 Augmentin    Hx  Tablets  875-125mg  20tab  one by  J20.9  Radha        s  mouth    Varn, N.P.



   -          every 12    



             hours for    



   /2016          ten days    

 

 Benzonatate    Hx  Capsules  100mg  30cap  one by  J20.9  Radha        s  mouth    Varn, N.P.



   -          three    



   12/21          times    



   /2016          daily as    



             needed    



             for cough    

 

 Fluticasone    Hx  Suspension  50mcg/Act  16gm  2 sprays  461.8  Paco



 Propionate            each    Leodan, NP



   -          nostril    



   10/13          qd           weeks    

 

 Levofloxacin    Hx  Tablets  500mg  10tab  one by    Paco        s  mouth    Leodan, NP



   -          daily for    



             10 days    



   /2015              

 

 Augmentin    Hx  Tablets  875-125mg  14tab  1 tablet  461.8  Paco



           s  by mouth    Leodan, NP



   -          q12 hours    



             for           days    

 

 Fluticasone    Hx  Suspension  50mcg/Act  16gm  2 sprays  461.8  Paco



 Propionate            each    Leodan, NP



   -          nostril    



             qd           weeks    

 

 Cheratussin ac    Hx  Syrup  100-10mg/  118ml  take 5-10  461.8  Paco



         5ML    millilite    Leodan, NP



   -          rs every    



             4-6 hours    



             as needed    



             for    



             cough.    

 

 Ventolin HFA    Hx  Aerosol  108(90Bas  18uni  Inhale  461.8  Paco      e)  ts  Two Puffs    Leodan, NP



   -      mcg/Act    By Mouth    



   10/13          Four    



   /2015          Times    



             Daily as    



             Needed    

 

 Ciclopirox    Hx  Solution  8%  6.600  apply to  110.1          ml  affected    Varn, N.P.



   -          toenails    



   12/31          and    



   /2014          surroundi    



             ng skin    



             at    



             bedtime    

 

 Ergocalciferol    Hx  Capsules  56588Iqmd  8caps  1 cap by    Leonor          mouth    Janet,



   -          every    M.D., FACP



   10/07          week    



   /2014              

 

 Amoxicillin    Hx  Capsules  500mg  30cap  1 tab po  472.1          s  tid for    Endo, M.D.



   -          10 days    



                 

 

 Lidoderm    Hx  Patches  5%  30uni  topical  724.02          ts  10 hours    Endo, M.D.



   -              



                 

 

 Cyclobenzaprine    Hx  Tablets  5mg  90tab  1 hs and  724.02  Honorio



 HCL  /2013        s  in 3 days    Endo, M.D.



   -          increase    



             to 2 h s    



   /          and add 1    



             prn    

 

 Venlafaxine HCL    Hx  Caps ER  150mg  60cap  Take One    Leonor



 ER      24HR    s  Capsule    Janet,



   -          By Mouth    M.D., FACP



             Twice    



             Daily    

 

 Indomethacin    Hx  Capsules  25mg  360ca  take 1  M45.7  ofia



           ps  capsules    Chris,



   -          by mouth    FNP



             twice    



             daily as    



             needed    

 

 Hydrocodone/Aceta  10/10  Hx  Tablets  5-325mg  30tab  1-2 tabs  728.85  Leonor



 minophen          s  by mouth    Janet,



   -          every 6    M.D., FACP



             hours as    



   /2013          needed    

 

 Cyclobenzaprine  10/10  Hx  Tablets  5mg  30tab  take one  728.85  Leonor



 HCL          s  at    Janet,



   -          bedtime    M.D., FACP



             as needed    



                 

 

 Indomethacin ER  10/10  Hx  Capsules ER  75mg    take one              capsule    GIBRAN Shah



   -          by mouth    



             twice a    



             day with    



             food    

 

 Indomethacin ER  10/10  Hx  Capsules ER  75mg    take one              capsule    GIBRAN Shah



   -          by mouth    



             twice a    



             day with    



             food    

 

 Metoprolol    Hx  Tablets ER  50mg  90tab  Take One    Leonor



 Succinate ER      24HR    s  Tablet By    Janet,



   -          Mouth One    M.D., FACP



             Time    



             Daily    

 

 Hydroxychloroquin    Hx  Tablets  200mg  60tab  Take One    



 e Sulfate          s  Tablet By    GIBRAN Shah



   -          Mouth    



   12/06          Twice    



   /2013          Daily    

 

 Sulfasalazine    Hx  Tablets  500mg  60tab  2 po bid            s      GIBRAN Shah



   -              



                 

 

 Cyclobenzaprine    Hx  Tablets  5mg  30tab  1 bid    Leonor



 HCL          s      Heidy Gonzales M.D., FACP



                 

 

 Indomethacin    Hx  Capsules  25mg  120ca  Take Two            ps  Capsules    GIBRAN Shah



   -          By Mouth    



   10/10          Twice    



   /2011          Daily    

 

 Effexor    Hx  Caps ER  150mg  60cap  1 by        24HR    s  mouth    Janet,



   -          twice    M.D., FACP



   07/25          daily    



   /2014              

 

 Effexor    Hx  Caps ER  150mg  60cap  1 by    Leonor    24HR    s  mouth    Janet,



   -          twice    M.D., FACP



   04/11          daily    



   /2011              

 

 Augmentin    Hx  Tablets  500-125mg                      Janet,



   -              M.D., [facility]



                 

 

 Augmentin    Hx  Tablets  875-125mg  20tab  one by            s  mouth    Janet,



   -          every 12    M.D., [facility]



             hours for    



   /2011          ten days    

 

 Veramyst    Hx  Suspension  27.5mcg/S  1Mont  2 sprays          pray  h  each    Janet,



   -          nostril    M.D., [facility]



   09/28          twice    



   /2011          daily    

 

 Anamantle HC    Hx  Kit  3-0.5%  20uni  Apply    Mali        ts  Twice    Cotton,



   -          Daily For    M.D.



   12/28          10 Days    



   /2010              

 

 Toprol XL    Hx  Tablets ER  50mg  90tab  1 tablet        24HR    s  daily    Janet,



   -              M.D., [facility]



                 

 

 Ivermectin    Hx    200mcg  5unit  take 5 po            s  in 1 dose    Janet,



   -          on empty    M.D., [facility]



   12/28          stomach    



   /2010          &amp;    



             repeat in    



             2 weeks    

 

 Plaquenil    Hx  Tablets  200mg  60tab  1 tablet    Unknown



   /0000        s  twice a    



   -          day    



                 

 

 Xyzal    Hx  Tablets  5mg  30tab  1 tablet    Unknown



   /0000        s  daily as    



   -          needed    



                 

 

 Effexor XR    Hx  Caps ER  75mg  30cap  1 capsule    Leonor



       24HR    s  twice a    Janet,



   -          day    M.D., [facility]



                 

 

 Indomethacin 25MG    Hx  Capsules  Unknown  60cap  2 tablets    Honorio



   /0000        s  twice a    Endo, M.D.



   -          day    



                 

 

 Vivelle-Dot    Hx  Patches  0.075mg/2  24uni  2 patches    Unknown



   /0000    Biweek  4HR  ts  weekly    



   -              



   07/10              



   /2015              

 

 Metoprolol    Hx  Tablets ER  50mg  90tab  1 by    Unknown



 Succinate ER  /0000    24HR    s  mouth    



   -          every day    



                 

 

 Allegra Allergy    Hx  Tablets      1 tab by    Unknown



   /0000          mouth at    



   -          dinner    



   02/26          time    



   /2018              

 

 Zyrtec Allergy  00/00  Hx  Tablets  10mg    1 by    Unknown



   /0000          mouth    



   -          every Am    



                 

 

 Tumersaid  00/00  Hx  Tablets      1 by    Unknown



   /0000          mouth    



   -          daily.    



                 







Medications Administered in Office







 Medication  Date  Status  Form  Strength  Qnty  SIG  Indications  Ordering



                 Provider

 

 Synvisc Or    Administered  Injection          Laure



 Synvisc-One  Shira Jeter M.D.



 Injection 1 MG                

 

 Synvisc Or    Administered  Injection          Laure



 Synvisc-Darwin Jeter M.D.



 Injection 1 MG                

 

 Synvisc Or    Administered  Injection          Laure



 Synvisc-One  Shira Jeter M.D.



 Injection 1 MG                

 

 Depomedrol    Administered  Injection          Laure



 40MG  Shira Jeter M.D.







Immunizations







 CPT Code  Status  Date  Vaccine  Lot #

 

 [id number]  Given  2017  Influenza Virus Vaccine, Quadrivalent, Split,  



       Preservative Free  

 

   Given  2017  Fluvirin Im 3Yrs And Older  

 

 [id number]  Given  10/29/2016  Influenza Virus Vaccine, Quadrivalent, Split,  



       Preservative Free  

 

 [id number]  Given  10/29/2016  Influenza Virus Vaccine, Quadrivalent, Split,  



       Preservative Free  

 

 [id number]  Given  10/29/2016  Influenza Virus Vaccine, Quadrivalent, Split,  



       Preservative Free  

 

   Given  10/07/2015  Fluvirin Im 3Yrs And Older  

 

   Given  10/01/2014  Fluvirin Im 3Yrs And Older  

 

   Given  10/01/2014  Afluria Vaccine  

 

 [id number]  Given  2014  Tdap - Tetanus/Diptheria/Acellular Pertussis  [id number]







Vital Signs







 Date  Vital  Result  Comment

 

 2018  Weight  139.00 lb  with shoes









 Heart Rate  81 /min  

 

 BP Systolic  114 mmHg  

 

 BP Diastolic  86 mmHg  

 

 O2 % BldC Oximetry  98 %  









 2017  Height  63.5 inches  5'3.50"









 Weight  135.00 lb  

 

 Heart Rate  79 /min  

 

 BP Systolic  138 mmHg  

 

 BP Diastolic  94 mmHg  

 

 Pain Level  7  

 

 BMI (Body Mass Index)  23.5 kg/m2  









 2017  Height  63 inches  5'3"









 Weight  137.00 lb  

 

 BP Systolic  122 mmHg  

 

 BP Diastolic  80 mmHg  

 

 Respiratory Rate  18 /min  

 

 Pain Level  2  

 

 BMI (Body Mass Index)  24.3 kg/m2  









 2017  Height  63 inches  5'3"









 Weight  137.00 lb  

 

 BP Systolic  118 mmHg  

 

 BP Diastolic  76 mmHg  

 

 Respiratory Rate  18 /min  

 

 Pain Level  3  

 

 BMI (Body Mass Index)  24.3 kg/m2  









 2017  Height  63 inches  5'3"









 Weight  137.00 lb  

 

 Heart Rate  84 /min  

 

 BP Systolic  124 mmHg  

 

 BP Diastolic  77 mmHg  

 

 Respiratory Rate  17 /min  

 

 Body Temperature  98.4 F  

 

 Pain Level  4  

 

 BMI (Body Mass Index)  24.3 kg/m2  









 2017  Height  63 inches  5'3"









 Weight  137.00 lb  

 

 Heart Rate  87 /min  

 

 BP Systolic  114 mmHg  

 

 BP Diastolic  80 mmHg  

 

 Body Temperature  98.7 F  

 

 Pain Level  4  

 

 O2 % BldC Oximetry  97 %  

 

 BMI (Body Mass Index)  24.3 kg/m2  









 2017  Height  63 inches  5'3"









 Weight  138.25 lb  

 

 Heart Rate  74 /min  

 

 BP Systolic  130 mmHg  

 

 BP Diastolic  80 mmHg  

 

 Body Temperature  97.5 F  

 

 O2 % BldC Oximetry  95 %  

 

 BMI (Body Mass Index)  24.5 kg/m2  









 2017  Height  63 inches  5'3"









 Weight  140.00 lb  

 

 Heart Rate  76 /min  

 

 BP Systolic  126 mmHg  

 

 BP Diastolic  70 mmHg  

 

 Respiratory Rate  15 /min  

 

 Body Temperature  97.2 F  

 

 Pain Level  5  

 

 BMI (Body Mass Index)  24.8 kg/m2  









 2017  Weight  137.00 lb  









 Heart Rate  83 /min  

 

 BP Systolic  140 mmHg  

 

 BP Diastolic  100 mmHg  

 

 BP Systolic Sitting  140 mmHg  

 

 BP Diastolic Sitting  92 mmHg  

 

 O2 % BldC Oximetry  97 %  









 2017  Height  63 inches  5'3"









 Weight  138.25 lb  

 

 Heart Rate  84 /min  

 

 BP Systolic Sitting  120 mmHg  

 

 BP Diastolic Sitting  70 mmHg  

 

 Respiratory Rate  14 /min  

 

 Pain Level  4  

 

 BMI (Body Mass Index)  24.5 kg/m2  









 2016  Weight  139.00 lb  









 Heart Rate  86 /min  

 

 BP Systolic Sitting  128 mmHg  

 

 BP Diastolic Sitting  82 mmHg  

 

 Respiratory Rate  15 /min  

 

 Body Temperature  97.4 F  

 

 O2 % BldC Oximetry  98 %  









 2016  Height  63 inches  5'3"









 Weight  143.00 lb  

 

 Heart Rate  88 /min  

 

 BP Systolic Sitting  130 mmHg  

 

 BP Diastolic Sitting  80 mmHg  

 

 Respiratory Rate  14 /min  

 

 Pain Level  4  

 

 BMI (Body Mass Index)  25.3 kg/m2  









 2016  Height  63 inches  5'3"









 Weight  140.00 lb  

 

 Heart Rate  88 /min  

 

 BP Systolic Sitting  134 mmHg  

 

 BP Diastolic Sitting  82 mmHg  

 

 Respiratory Rate  15 /min  

 

 Body Temperature  98.2 F  

 

 O2 % BldC Oximetry  98 %  

 

 BMI (Body Mass Index)  24.8 kg/m2  









 10/13/2015  Height  63 inches  5'3"









 Weight  140.00 lb  

 

 Heart Rate  76 /min  

 

 BP Systolic Sitting  120 mmHg  

 

 BP Diastolic Sitting  70 mmHg  

 

 Pain Level  4  

 

 BMI (Body Mass Index)  24.8 kg/m2  









 07/10/2015  Weight  138.00 lb  









 Heart Rate  76 /min  

 

 BP Systolic Sitting  130 mmHg  

 

 BP Diastolic Sitting  88 mmHg  

 

 Pain Level  6  









 2015  Weight  144.00 lb  









 Heart Rate  77 /min  

 

 BP Systolic Sitting  124 mmHg  

 

 BP Diastolic Sitting  83 mmHg  

 

 Body Temperature  97.8 F  









 2015  Height  63 inches  5'3"









 Weight  144.00 lb  

 

 Heart Rate  88 /min  

 

 BP Systolic Sitting  124 mmHg  

 

 BP Diastolic Sitting  80 mmHg  

 

 Body Temperature  99.0 F  

 

 Pain Level  8  

 

 BMI (Body Mass Index)  25.5 kg/m2  









 2015  Height  63 inches  5'3"









 Weight  144.00 lb  

 

 Heart Rate  74 /min  

 

 BP Systolic  124 mmHg  

 

 BP Diastolic  86 mmHg  

 

 Body Temperature  98.3 F  

 

 O2 % BldC Oximetry  96 %  

 

 BMI (Body Mass Index)  25.5 kg/m2  









 2014  Height  63 inches  5'3"









 Weight  143.00 lb  

 

 Heart Rate  68 /min  

 

 BP Systolic Sitting  128 mmHg  

 

 BP Diastolic Sitting  88 mmHg  

 

 BMI (Body Mass Index)  25.3 kg/m2  









 2014  Height  63 inches  5'3"









 Weight  145.00 lb  

 

 Heart Rate  68 /min  

 

 BP Systolic Sitting  128 mmHg  

 

 BP Diastolic Sitting  88 mmHg  

 

 Pain Level  5  

 

 BMI (Body Mass Index)  25.7 kg/m2  









 10/07/2014  Height  63 inches  5'3"









 Weight  143.00 lb  

 

 Heart Rate  80 /min  

 

 BP Systolic Sitting  130 mmHg  

 

 BP Diastolic Sitting  86 mmHg  

 

 Pain Level  7  

 

 BMI (Body Mass Index)  25.3 kg/m2  









 2014  Height  63 inches  5'3"









 Weight  141.00 lb  

 

 Heart Rate  76 /min  

 

 BP Systolic Sitting  124 mmHg  

 

 BP Diastolic Sitting  80 mmHg  

 

 BMI (Body Mass Index)  25.0 kg/m2  









 2014  Height  63 inches  5'3"









 Weight  141.50 lb  

 

 Heart Rate  76 /min  

 

 BP Systolic Sitting  132 mmHg  

 

 BP Diastolic Sitting  92 mmHg  

 

 Pain Level  5  

 

 BMI (Body Mass Index)  25.1 kg/m2  









 2014  Height  63 inches  5'3"









 Weight  143.25 lb  

 

 Heart Rate  88 /min  

 

 BP Systolic Sitting  118 mmHg  

 

 BP Diastolic Sitting  78 mmHg  

 

 BMI (Body Mass Index)  25.4 kg/m2  









 2014  Height  63 inches  5'3"









 Weight  148.00 lb  

 

 Heart Rate  79 /min  

 

 BP Systolic Sitting  126 mmHg  

 

 BP Diastolic Sitting  88 mmHg  

 

 BMI (Body Mass Index)  26.2 kg/m2  









 2014  Height  63 inches  5'3"









 Weight  144.00 lb  

 

 Heart Rate  76 /min  

 

 BP Systolic Sitting  120 mmHg  

 

 BP Diastolic Sitting  68 mmHg  

 

 BMI (Body Mass Index)  25.5 kg/m2  









 2014  Height  63 inches  5'3"









 Weight  147.25 lb  

 

 Heart Rate  78 /min  

 

 BP Systolic Sitting  140 mmHg  

 

 BP Diastolic Sitting  98 mmHg  

 

 BMI (Body Mass Index)  26.1 kg/m2  









 2013  Weight  146.00 lb  









 Heart Rate  78 /min  

 

 BP Systolic Sitting  122 mmHg  

 

 BP Diastolic Sitting  80 mmHg  

 

 Body Temperature  99.0 F  









 2013  Height  64 inches  5'4"









 Weight  142.00 lb  

 

 Heart Rate  77 /min  

 

 BP Systolic Sitting  122 mmHg  

 

 BP Diastolic Sitting  67 mmHg  

 

 BMI (Body Mass Index)  24.4 kg/m2  









 2013  Height  64 inches  5'4"









 Weight  141.00 lb  

 

 Heart Rate  84 /min  

 

 BP Systolic Sitting  118 mmHg  

 

 BP Diastolic Sitting  76 mmHg  

 

 BMI (Body Mass Index)  24.2 kg/m2  









 2012  Height  64 inches  5'4"









 Weight  149.50 lb  

 

 Heart Rate  72 /min  

 

 BP Systolic Sitting  128 mmHg  

 

 BP Diastolic Sitting  78 mmHg  

 

 BMI (Body Mass Index)  25.7 kg/m2  









 2012  Height  64 inches  5'4"









 Weight  152.00 lb  

 

 Heart Rate  64 /min  

 

 BP Systolic Sitting  114 mmHg  

 

 BP Diastolic Sitting  78 mmHg  

 

 BMI (Body Mass Index)  26.1 kg/m2  









 2011  Height  64 inches  5'4"









 Weight  149.00 lb  

 

 Heart Rate  74 /min  

 

 BP Systolic Sitting  120 mmHg  

 

 BP Diastolic Sitting  78 mmHg  

 

 BMI (Body Mass Index)  25.6 kg/m2  









 10/10/2011  Height  64 inches  5'4"









 Weight  150.00 lb  

 

 Heart Rate  68 /min  

 

 BP Systolic Sitting  114 mmHg  

 

 BP Diastolic Sitting  62 mmHg  

 

 BMI (Body Mass Index)  25.7 kg/m2  









 2011  Height  64 inches  5'4"









 Weight  150.00 lb  

 

 Heart Rate  68 /min  

 

 BP Systolic Sitting  112 mmHg  

 

 BP Diastolic Sitting  68 mmHg  

 

 BMI (Body Mass Index)  25.7 kg/m2  









 2011  Height  64 inches  5'4"









 Weight  150.00 lb  

 

 Heart Rate  68 /min  

 

 BP Systolic Sitting  122 mmHg  

 

 BP Diastolic Sitting  76 mmHg  

 

 Body Temperature  98.9 F  

 

 BMI (Body Mass Index)  25.7 kg/m2  









 2011  Height  64 inches  5'4"









 Weight  154.00 lb  

 

 Heart Rate  80 /min  

 

 BP Systolic  118 mmHg  

 

 BP Diastolic  70 mmHg  

 

 BMI (Body Mass Index)  26.4 kg/m2  









 2011  Weight  157.75 lb  









 Heart Rate  78 /min  

 

 BP Systolic  124 mmHg  

 

 BP Diastolic  88 mmHg  









 2010  Weight  152.00 lb  









 Heart Rate  78 /min  

 

 BP Systolic  116 mmHg  128/82

 

 BP Diastolic  90 mmHg  128/82

 

 Body Temperature  97.7 F  







Results







 Test  Date  Test  Result  H/L  Range  Note

 

 Order  2018  EKG  results given to [facility] at [place]      

 

 Lipid Profile  07/10/2017  Triglycerides  45 mg/dL      1



 (Trig/Chol/HDL)            









 Cholesterol  221 mg/dL      2

 

 HDL Cholesterol  71.7 mg/dL      3

 

 LDL Cholesterol  140 mg/dL      4









 CBC Auto Diff  2017  White Blood Count  7.9 10^3/uL    3.5-10.8  









 Red Blood Count  4.57 10^6/uL    4.0-5.4  

 

 Hemoglobin  13.8 g/dL    12.0-16.0  

 

 Hematocrit  41 %    35-47  

 

 Mean Corpuscular Volume  91 fL    80-97  

 

 Mean Corpuscular Hemoglobin  30 pg    27-31  

 

 Mean Corpuscular HGB Conc  33 g/dL    31-36  

 

 Red Cell Distribution Width  13 %    10.5-15  

 

 Platelet Count  155 10^3/uL    150-450  

 

 Mean Platelet Volume  11 um3  High  7.4-10.4  

 

 Abs Neutrophils  5.8 10^3/uL    1.5-7.7  

 

 Abs Lymphocytes  1.3 10^3/uL    1.0-4.8  

 

 Abs Monocytes  0.6 10^3/uL    0-0.8  

 

 Abs Eosinophils  0.1 10^3/uL    0-0.6  

 

 Abs Basophils  0.1 10^3/uL    0-0.2  

 

 Abs Nucleated RBC  0.01 10^3/uL      

 

 Granulocyte %  73.3 %    38-83  

 

 Lymphocyte %  16.7 %  Low  25-47  

 

 Monocyte %  7.6 %    1-9  

 

 Eosinophil %  1.0 %    0-6  

 

 Basophil %  1.4 %    0-2  

 

 Nucleated Red Blood Cells %  0.1      









 Comp Metabolic Panel  2017  Sodium  138 mmol/L    133-145  









 Potassium  4.0 mmol/L    3.5-5.0  

 

 Chloride  103 mmol/L    101-111  

 

 Co2 Carbon Dioxide  33 mmol/L  High  22-32  

 

 Anion Gap  2 mmol/L    2-11  

 

 Glucose  106 mg/dL  High    

 

 Blood Urea Nitrogen  16 mg/dL    6-24  

 

 Creatinine  0.70 mg/dL    0.51-0.95  

 

 BUN/Creatinine Ratio  22.9  High  8-20  

 

 Calcium  9.3 mg/dL    8.6-10.3  

 

 Total Protein  6.6 g/dL    6.4-8.9  

 

 Albumin  4.0 g/dL    3.2-5.2  

 

 Globulin  2.6 g/dL    2-4  

 

 Albumin/Globulin Ratio  1.5    1-3  

 

 Total Bilirubin  0.30 mg/dL    0.2-1.0  

 

 Alkaline Phosphatase  68 U/L      

 

 Alt  22 U/L    7-52  

 

 Ast  23 U/L    13-39  

 

 Egfr Non-  86.2    &gt;60  

 

 Egfr   110.9    &gt;60  5









 Laboratory test finding  2017  C Reactive Protein  1.12 mg/L    &lt; 
5.00  6









 Erythrocyte Sed Rate  20 mm/Hr    0-30  









 CBC Auto Diff  2016  White Blood Count  7.6 10^3/uL    3.5-10.8  









 Red Blood Count  4.38 10^6/uL    4.0-5.4  

 

 Hemoglobin  13.2 g/dL    12.0-16.0  

 

 Hematocrit  40 %    35-47  

 

 Mean Corpuscular Volume  92 fL    80-97  

 

 Mean Corpuscular Hemoglobin  30 pg    27-31  

 

 Mean Corpuscular HGB Conc  33 g/dL    31-36  

 

 Red Cell Distribution Width  13 %    10.5-15  

 

 Platelet Count  171 10^3/uL    150-450  

 

 Mean Platelet Volume  10 um3    7.4-10.4  

 

 Abs Neutrophils  4.7 10^3/uL    1.5-7.7  

 

 Abs Lymphocytes  1.9 10^3/uL    1.0-4.8  

 

 Abs Monocytes  0.8 10^3/uL    0-0.8  

 

 Abs Eosinophils  0.1 10^3/uL    0-0.6  

 

 Abs Basophils  0 10^3/uL    0-0.2  

 

 Abs Nucleated RBC  0 10^3/uL      

 

 Granulocyte %  62.4 %    38-83  

 

 Lymphocyte %  24.8 %  Low  25-47  

 

 Monocyte %  10.6 %  High  1-9  

 

 Eosinophil %  1.6 %    0-6  

 

 Basophil %  0.6 %    0-2  

 

 Nucleated Red Blood Cells %  0.1      









 Comp Metabolic Panel  2016  Sodium  133 mmol/L    133-145  









 Potassium  3.9 mmol/L    3.5-5.0  

 

 Chloride  97 mmol/L  Low  101-111  

 

 Co2 Carbon Dioxide  30 mmol/L    22-32  

 

 Anion Gap  6 mmol/L    2-11  

 

 Glucose  80 mg/dL      

 

 Blood Urea Nitrogen  16 mg/dL    6-24  

 

 Creatinine  0.70 mg/dL    0.51-0.95  

 

 BUN/Creatinine Ratio  22.9  High  8-20  

 

 Calcium  9.3 mg/dL    8.6-10.3  

 

 Total Protein  6.7 g/dL    6.4-8.9  

 

 Albumin  4.4 g/dL    3.2-5.2  

 

 Globulin  2.3 g/dL    2-4  

 

 Albumin/Globulin Ratio  1.9    1-3  

 

 Total Bilirubin  0.30 mg/dL    0.2-1.0  

 

 Alkaline Phosphatase  65 U/L      

 

 Alt  23 U/L    7-52  

 

 Ast  20 U/L    13-39  

 

 Egfr Non-  86.6    &gt;60  

 

 Egfr   111.3    &gt;60  7









 Laboratory test finding  2016  C Reactive Protein  1.23 mg/L    &lt; 
5.00  8









 Erythrocyte Sed Rate  18 mm/Hr    0-30  









 CBC Auto Diff  10/13/2015  White Blood Count  7.9 10^3/uL    4.8-10.8  









 Red Blood Count  4.49 10^6/uL    4.0-5.4  

 

 Hemoglobin  13.9 g/dL    12.0-16.0  

 

 Hematocrit  42 %    35-47  

 

 Mean Corpuscular Volume  95 fL    80-97  

 

 Mean Corpuscular Hemoglobin  31 pg    27-31  

 

 Mean Corpuscular HGB Conc  33 g/dL    31-36  

 

 Red Cell Distribution Width  13 %    10.5-15  

 

 Platelet Count  152 10^3/uL    150-450  

 

 Mean Platelet Volume  10 um3    7.4-10.4  

 

 Abs Neutrophils  5.3 10^3/uL    1.5-7.7  

 

 Abs Lymphocytes  1.6 10^3/uL    1.0-4.8  

 

 Abs Monocytes  0.9 10^3/uL  High  0-0.8  

 

 Abs Eosinophils  0.1 10^3/uL    0-0.6  

 

 Abs Basophils  0.1 10^3/uL    0-0.2  

 

 Abs Nucleated RBC  0.01 10^3/uL      

 

 Granulocyte %  67.1 %    38-83  

 

 Lymphocyte %  19.5 %  Low  25-47  

 

 Monocyte %  11.7 %  High  1-9  

 

 Eosinophil %  1.0 %    0-6  

 

 Basophil %  0.7 %    0-2  

 

 Nucleated Red Blood Cells %  0.1      









 Comp Metabolic Panel  10/13/2015  Sodium  135 mmol/L    133-145  









 Potassium  4.4 mmol/L    3.5-5.0  

 

 Chloride  100 mmol/L  Low  101-111  

 

 Co2 Carbon Dioxide  30 mmol/L    22-32  

 

 Anion Gap  5 mmol/L    2-11  

 

 Glucose  83 mg/dL      

 

 Blood Urea Nitrogen  17 mg/dL    6-24  

 

 Creatinine  0.71 mg/dL    0.51-0.95  

 

 BUN/Creatinine Ratio  23.9  High  8-20  

 

 Calcium  9.8 mg/dL    8.6-10.3  

 

 Total Protein  6.6 g/dL    6.4-8.9  

 

 Albumin  4.4 g/dL    3.2-5.2  

 

 Globulin  2.2 g/dL    2-4  

 

 Albumin/Globulin Ratio  2.0    1-3  

 

 Total Bilirubin  0.40 mg/dL    0.2-1.0  

 

 Alkaline Phosphatase  72 U/L      

 

 Alt  39 U/L    7-52  

 

 Ast  30 U/L    13-39  

 

 Egfr Non-  85.5    &gt;60  

 

 Egfr   109.9    &gt;60  9









 Laboratory test finding  10/13/2015  Erythrocyte Sed Rate  17 mm/Hr    0-30  









 C Reactive Protein  &lt; 1.00 mg/L    &lt; 5.00  10









 Laboratory test finding  2015  Throat Culture  (SEE NOTE)      11

 

 CBC Auto Diff  2015  White Blood Count  6.7 10^3/uL    4.8-10.8  









 Red Blood Count  4.34 10^6/uL    4.0-5.4  

 

 Hemoglobin  13.5 g/dL    12.0-16.0  

 

 Hematocrit  40 %    35-47  

 

 Mean Corpuscular Volume  93 fL    80-97  

 

 Mean Corpuscular Hemoglobin  31 pg    27-31  

 

 Mean Corpuscular HGB Conc  34 g/dL    31-36  

 

 Red Cell Distribution Width  13 %    10.5-15  

 

 Platelet Count  138 10^3/uL  Low  150-450  

 

 Mean Platelet Volume  12 um3  High  7.4-10.4  

 

 Abs Neutrophils  3.9 10^3/uL    1.5-7.7  

 

 Abs Lymphocytes  1.8 10^3/uL    1.0-4.8  

 

 Abs Monocytes  0.8 10^3/uL    0-0.8  

 

 Abs Eosinophils  0.1 10^3/uL    0-0.6  

 

 Abs Basophils  0.1 10^3/uL    0-0.2  

 

 Abs Nucleated RBC  0 10^3/uL      

 

 Granulocyte %  58.6 %    38-83  

 

 Lymphocyte %  27.1 %    25-47  

 

 Monocyte %  11.9 %  High  1-9  

 

 Eosinophil %  1.5 %    0-6  

 

 Basophil %  0.9 %    0-2  

 

 Nucleated Red Blood Cells %  0      









 Comp Metabolic Panel  2015  Sodium  136 mmol/L    133-145  









 Potassium  4.5 mmol/L    3.5-5.0  

 

 Chloride  103 mmol/L    101-111  

 

 Co2 Carbon Dioxide  29 mmol/L    22-32  

 

 Anion Gap  4 mmol/L    2-11  

 

 Glucose  90 mg/dL      

 

 Blood Urea Nitrogen  15 mg/dL    6-24  

 

 Creatinine  0.72 mg/dL    0.51-0.95  

 

 BUN/Creatinine Ratio  20.8  High  8-20  

 

 Calcium  9.0 mg/dL    8.6-10.3  

 

 Total Protein  6.6 g/dL    6.4-8.9  

 

 Albumin  4.2 g/dL    3.2-5.2  

 

 Globulin  2.4 g/dL    2-4  

 

 Albumin/Globulin Ratio  1.8    1-3  

 

 Total Bilirubin  0.20 mg/dL    0.2-1.0  

 

 Alkaline Phosphatase  66 U/L      

 

 Alt  37 U/L    7-52  

 

 Ast  29 U/L    13-39  

 

 Egfr Non-  84.1    &gt;60  

 

 Egfr   108.2    &gt;60  12









 Laboratory test finding  2015  C Reactive Protein  &lt; 1.00 mg/L    &lt
; 5.00  13









 Erythrocyte Sed Rate  14 mm/Hr    0-30  

 

 Uric Acid  4.3 mg/dL    2.3-6.6  









 Laboratory test finding  2015  TSH (Thyroid Stimulating  1.92 IU/mL    
0.34-5.60  



     Horm)        

 

 Vitamin B12 And Folate  2015  Vitamin B12  564 pg/mL    180-914  14



 Serum            









 Folate  15.37 ng/mL    &gt;3.99  









 Laboratory test finding  2015  Ferritin  82.3 ng/mL      

 

 CBC Auto Diff  10/13/2014  White Blood Count  5.6 10^3/uL    4.8-10.8  









 Red Blood Count  4.60 10^6/uL    4.0-5.4  

 

 Hemoglobin  13.8 g/dL    12.0-16.0  

 

 Hematocrit  41 %    35-47  

 

 Mean Corpuscular Volume  90 fL    80-97  

 

 Mean Corpuscular Hemoglobin  30 pg    27-31  

 

 Mean Corpuscular HGB Conc  33 g/dL    31-36  

 

 Red Cell Distribution Width  13 %    10.5-15  

 

 Platelet Count  146 10^3/uL  Low  150-450  

 

 Mean Platelet Volume  11 um3  High  7.4-10.4  

 

 Abs Neutrophils  3.7 10^3/uL    1.5-7.7  

 

 Abs Lymphocytes  1.2 10^3/uL    1.0-4.8  

 

 Abs Monocytes  0.6 10^3/uL    0-0.8  

 

 Abs Eosinophils  0.1 10^3/uL    0-0.6  

 

 Abs Basophils  0 10^3/uL    0-0.2  

 

 Abs Nucleated RBC  0 10^3/uL      

 

 Granulocyte %  65.9 %    38-83  

 

 Lymphocyte %  21.7 %  Low  25-47  

 

 Monocyte %  9.9 %  High  1-9  

 

 Eosinophil %  1.7 %    0-6  

 

 Basophil %  0.8 %    0-2  

 

 Nucleated Red Blood Cells %  0      









 Comp Metabolic Panel  10/13/2014  Sodium  139 mmol/L    133-145  









 Potassium  4.5 mmol/L    3.7-5.6  

 

 Chloride  105 mmol/L    101-111  

 

 Co2 Carbon Dioxide  31 mmol/L    22-32  

 

 Anion Gap  3 mmol/L    2-11  

 

 Glucose  83 mg/dL      

 

 Blood Urea Nitrogen  18 mg/dL    6-24  

 

 Creatinine  0.70 mg/dL    0.51-0.95  

 

 BUN/Creatinine Ratio  25.7  High  8-20  

 

 Calcium  9.3 mg/dL    8.6-10.3  

 

 Total Protein  6.5 g/dL    6.4-8.9  

 

 Albumin  4.0 g/dL    3.2-5.2  

 

 Globulin  2.5 g/dL    2-4  

 

 Albumin/Globulin Ratio  1.6    1-3  

 

 Total Bilirubin  0.40 mg/dL    0.2-1.0  

 

 Alkaline Phosphatase  56 U/L      

 

 Alt  17 U/L    7-52  

 

 Ast  16 U/L    13-39  

 

 Egfr Non-  87.2    &gt;60  

 

 Egfr   112.1    &gt;60  15









 Laboratory test finding  10/13/2014  C Reactive Protein  &lt; 1.00 mg/L    &lt
; 5.00  16









 Erythrocyte Sed Rate  18 mm/Hr    0-30  









 Lipid Profile (Trig/Chol/HDL)  10/13/2014  Triglycerides  43 mg/dL      17









 Cholesterol  202 mg/dL      18

 

 HDL Cholesterol  61.4 mg/dL      19

 

 LDL Cholesterol  132 mg/dL      20









 CBC Auto Diff  2014  White Blood Count  6.5 10^3/uL    4.8-10.8  









 Red Blood Count  4.38 10^6/uL    4.0-5.4  

 

 Hemoglobin  13.5 g/dL    12.0-16.0  

 

 Hematocrit  39 %    35-47  

 

 Mean Corpuscular Volume  89 fL    80-97  

 

 Mean Corpuscular Hemoglobin  31 pg    27-31  

 

 Mean Corpuscular HGB Conc  35 g/dL    31-36  

 

 Red Cell Distribution Width  13 %    10.5-15  

 

 Platelet Count  131 10^3/uL  Low  150-450  

 

 Mean Platelet Volume  12 um3  High  7.4-10.4  

 

 Abs Neutrophils  4.2 10^3/uL    1.5-7.7  

 

 Abs Lymphocytes  1.5 10^3/uL    1.0-4.8  

 

 Abs Monocytes  0.7 10^3/uL    0-0.8  

 

 Abs Eosinophils  0.1 10^3/uL    0-0.6  

 

 Abs Basophils  0 10^3/uL    0-0.2  

 

 Abs Nucleated RBC  0 10^3/uL      

 

 Granulocyte %  64.6 %    38-83  

 

 Lymphocyte %  23.1 %  Low  25-47  

 

 Monocyte %  10.5 %  High  1-9  

 

 Eosinophil %  1.2 %    0-6  

 

 Basophil %  0.6 %    0-2  

 

 Nucleated Red Blood Cells %  0      









 Laboratory test finding  2014  C Reactive Protein  &lt; 1.00 mg/L    &lt
; 5.00  21









 Erythrocyte Sed Rate  20 mm/Hr    0-30  









 Comp Metabolic Panel  2014  Sodium  137 mmol/L    133-145  









 Potassium  4.7 mmol/L    3.7-5.6  

 

 Chloride  103 mmol/L    101-111  

 

 Co2 Carbon Dioxide  31 mmol/L    22-32  

 

 Anion Gap  3 mmol/L    2-11  

 

 Glucose  104 mg/dL  High    

 

 Blood Urea Nitrogen  19 mg/dL    6-24  

 

 Creatinine  0.93 mg/dL    0.51-0.95  

 

 BUN/Creatinine Ratio  20.4  High  8-20  

 

 Calcium  9.1 mg/dL    8.6-10.3  

 

 Total Protein  6.5 g/dL    6.4-8.9  

 

 Albumin  4.2 g/dL    3.2-5.2  

 

 Globulin  2.3 g/dL    2-4  

 

 Albumin/Globulin Ratio  1.8    1-3  

 

 Total Bilirubin  0.20 mg/dL    0.2-1.0  

 

 Alkaline Phosphatase  58 U/L      

 

 Alt  18 U/L    7-52  

 

 Ast  19 U/L    13-39  

 

 Egfr Non-  62.8    &gt;60  

 

 Egfr   80.8    &gt;60  22









 Immunoglobulins Serum Quant  2014  Immunoglobulin G  1140 mg/dL    767 - 
1590  23









 Immunoglobulin M  89 mg/dL    37 - 286  

 

 Immunoglobulin A  284 mg/dL    61 - 356  









 Protein Electrophoresis  2014  Total Protein(Pep)  6.7 g/dL    6.3 - 7.9
  









 Albumin  3.5 g/dL    3.4-4.7  

 

 Alpha-1 Globulin  0.2 g/dL    0.1-0.3  

 

 Alpha-2 Globulin  0.8 g/dL    0.6-1.0  

 

 Beta Globulin  0.9 g/dL    0.7-1.2  

 

 Gamma Globulin  1.3 g/dL    0.6-1.6  

 

 Albumin/Globulin Ratio  1.09      

 

 Impression  See Comment      24









 Laboratory test finding  2014  [place] (Anti-Nuclear AB)  Negative    
Negative  



     Screen        









 [place] Screen  Negative    Negative  25









 Lipid Profile (Trig/Chol/HDL)  2014  Triglycerides  77 mg/dL      26









 Cholesterol  204 mg/dL      27

 

 HDL Cholesterol  60.3 mg/dL      28

 

 LDL Cholesterol  128 mg/dL      29









 Laboratory test finding  2014  TSH (Thyroid  2.11 IU/mL    0.34-5.60  



     Stimulating Horm)        

 

 Vitamin D, 25 Hydroxy  2014  25-Hydroxy Vitamin D2  &lt;4.0 ng/mL      









 25-Hydroxy Vitamin D3  27 ng/mL      

 

 25-Hydroxy Vitamin D Total  27 ng/mL      30









 Laboratory test finding  2014  Hepatitis C Antibody  Nonreactive    
Nonreactive  31

 

 CBC Auto Diff  2014  White Blood Count  6.8 10^3/uL    4.8-10.8  









 Red Blood Count  4.28 10^6/uL    4.0-5.4  

 

 Hemoglobin  12.8 g/dL    12.0-16.0  

 

 Hematocrit  38 %    35-47  

 

 Mean Corpuscular Volume  89 fL    80-97  

 

 Mean Corpuscular Hemoglobin  30 pg    27-31  

 

 Mean Corpuscular HGB Conc  34 g/dL    31-36  

 

 Red Cell Distribution Width  13 %    10.5-15  

 

 Platelet Count  167 10^3/uL    150-450  32

 

 Mean Platelet Volume  11 um3  High  7.4-10.4  

 

 Abs Neutrophils  4.3 10^3/uL    1.5-7.7  

 

 Abs Lymphocytes  1.6 10^3/uL    1.0-4.8  

 

 Abs Monocytes  0.7 10^3/uL    0-0.8  

 

 Abs Eosinophils  0.2 10^3/uL    0-0.6  

 

 Abs Basophils  0.1 10^3/uL    0-0.2  

 

 Abs Nucleated RBC  0 10^3/uL      

 

 Granulocyte %  63.0 %    38-83  

 

 Lymphocyte %  23.6 %  Low  25-47  

 

 Monocyte %  10.1 %  High  1-9  

 

 Eosinophil %  2.5 %    0-6  

 

 Basophil %  0.8 %    0-2  

 

 Nucleated Red Blood Cells %  0      









 Laboratory test finding  2014  Erythrocyte Sed Rate  14 mm/Hr    0-30  33









 C Reactive Protein  &lt; 0.5 mg/dL    Less than 0.5  









 Comp Metabolic Panel  2014  Sodium  134 mmol/L    133-145  









 Potassium  4.1 mmol/L    3.5-5.0  

 

 Chloride  99 mmol/L  Low  101-111  

 

 Co2 Carbon Dioxide  30.0 mmol/L    22-32  

 

 Anion Gap  5.0 mmol/L    2-11  

 

 Glucose  91 mg/dL      

 

 Blood Urea Nitrogen  19 mg/dL    6-24  

 

 Creatinine  0.60 mg/dL    0.50-1.40  

 

 BUN/Creatinine Ratio  31.7  High  8-20  

 

 Calcium  9.3 mg/dL    8.1-9.9  

 

 Total Protein  6.7 g/dL    6.2-8.1  

 

 Albumin  3.9 g/dL    3.6-5.4  

 

 Globulin  2.8 g/dL    2-4  

 

 Albumin/Globulin Ratio  1.4    1-3  

 

 Total Bilirubin  0.4 mg/dL    0.4-1.5  

 

 Alkaline Phosphatase  65 U/L      

 

 Alt  25 U/L    14-54  

 

 Ast  22 U/L    12-42  

 

 Egfr Non-  104.2    &gt;60  

 

 Egfr   134.0    &gt;60  34









 Laboratory test finding  2013  Rapid Strep A  neg      

 

 CBC Auto Diff  2013  White Blood Count  7.2 10^3/uL    4.8-10.8  









 Red Blood Count  4.27 10^6/uL    4.0-5.4  

 

 Hemoglobin  12.9 g/dL    12.0-16.0  

 

 Hematocrit  39 %    35-47  

 

 Mean Corpuscular Volume  91 fL    80-97  

 

 Mean Corpuscular Hemoglobin  30 pg    27-31  

 

 Mean Corpuscular HGB Conc  33 g/dL    31-36  

 

 Red Cell Distribution Width  13 %    10.5-15  

 

 Platelet Count  195 10^3/uL    150-450  35

 

 Mean Platelet Volume  11 um3  High  7.4-10.4  

 

 Abs Neutrophils  4.6 10^3/uL    1.5-7.7  

 

 Abs Lymphocytes  1.5 10^3/uL    1.0-4.8  

 

 Abs Monocytes  0.9 10^3/uL  High  0-0.8  

 

 Abs Eosinophils  0.2 10^3/uL    0-0.6  

 

 Abs Basophils  0.1 10^3/uL    0-0.2  

 

 Abs Nucleated RBC  0.01 10^3/uL      

 

 Granulocyte %  64.2 %    38-83  

 

 Lymphocyte %  20.4 %  Low  25-47  

 

 Monocyte %  12.1 %  High  1-9  

 

 Eosinophil %  2.5 %    0-6  

 

 Basophil %  0.8 %    0-2  

 

 Nucleated Red Blood Cells %  0.1      









 Comp Metabolic Panel  2013  Sodium  137 mmol/L    133-145  









 Potassium  3.9 mmol/L    3.5-5.0  

 

 Chloride  102 mmol/L    101-111  

 

 Co2 Carbon Dioxide  30.0 mmol/L    22-32  

 

 Anion Gap  5.0 mmol/L    2-11  

 

 Glucose  93 mg/dL      

 

 Blood Urea Nitrogen  15 mg/dL    6-24  

 

 Creatinine  0.80 mg/dL    0.50-1.40  

 

 BUN/Creatinine Ratio  18.8    8-20  

 

 Calcium  9.0 mg/dL    8.1-9.9  

 

 Total Protein  6.2 g/dL    6.2-8.1  

 

 Albumin  3.8 g/dL    3.6-5.4  

 

 Globulin  2.4 g/dL    2-4  

 

 Albumin/Globulin Ratio  1.6    1-3  

 

 Total Bilirubin  0.4 mg/dL    0.4-1.5  

 

 Alkaline Phosphatase  69 U/L      

 

 Alt  27 U/L    14-54  

 

 Ast  23 U/L    12-42  

 

 Egfr Non-  75.0    &gt;60  

 

 Egfr   96.5    &gt;60  36









 Laboratory test finding  2013  TSH (Thyroid Stimulating  0.98 miu/mL    
0.34-5.60  



     Horm)        

 

 Laboratory test finding  2013  Erythrocyte Sed Rate  17 mm/Hr    0-30  









 C Reactive Protein  &lt; 0.5 mg/dL    Less than 0.5  









 Comp Metabolic Panel  2013  Sodium  138 mmol/L    133-145  









 Potassium  4.7 mmol/L    3.5-5.0  

 

 Chloride  101 mmol/L    101-111  

 

 Co2 Carbon Dioxide  31.0 mmol/L    22-32  

 

 Anion Gap  6.0 mmol/L    2-11  

 

 Glucose  93 mg/dL      

 

 Blood Urea Nitrogen  18 mg/dL    6-24  

 

 Creatinine  0.90 mg/dL    0.50-1.40  

 

 BUN/Creatinine Ratio  20.0    8-20  

 

 Calcium  9.6 mg/dL    8.1-9.9  

 

 Total Protein  6.1 g/dL  Low  6.2-8.1  

 

 Albumin  3.9 g/dL    3.6-5.4  

 

 Globulin  2.2 g/dL    2-4  

 

 Albumin/Globulin Ratio  1.8    1-3  

 

 Total Bilirubin  0.4 mg/dL    0.4-1.5  

 

 Alkaline Phosphatase  64 U/L      

 

 Alt  19 U/L    14-54  

 

 Ast  18 U/L    12-42  

 

 Egfr Non-  65.5    &gt;60  

 

 Egfr   84.2    &gt;60  37









 Laboratory test finding  2011  Amylase  42 U/L      38

 

 Laboratory test finding  2011  Lipase  33 U/L    22-51  

 

 Comp Metabolic Panel  2011  Sodium  137 mmol/L    135-145  









 Potassium  4.2 mmol/L    3.5-5.0  

 

 Chloride  104 mmol/L    101-111  

 

 Co2 (Carbon Dioxide)  27.0 mmol/L    22-32  

 

 Anion Gap  6.0 mmol/L    2-11  39

 

 Glucose  91 mg/dL      

 

 BUN  13 mg/dL    6-24  

 

 Creatinine  0.80 mg/dL    0.50-1.40  

 

 One Over Creatinine  1.20      

 

 BUN/Creatinine Ratio  16.3    8-20  

 

 Calcium  9.2 mg/dL    8.1-9.9  

 

 Total Protein  6.4 GM/DL    6.2-8.1  

 

 Albumin  4.0 GM/DL    3.6-5.4  

 

 Globulin  2.4 GM/DL    2-4  

 

 Albumin/Globulin Ratio  1.7    1-3  

 

 Bilirubin Total  0.5 mg/dL    0.4-1.5  40

 

 Alkaline Phosphatase  59 U/L      

 

 Alt (SGPT)  23 U/L    14-54  

 

 Ast (Sgot)  23 U/L    12-42  

 

 eGFR Non-  75.6    &gt; 60  

 

 eGFR   97.3    &gt; 60  41









 CBC With Manual Diff  2011  White Blood Count  6.8 CUMM    4.8-10.8  









 Red Cell Count  4.36 CUMM    4.2-5.4  

 

 Hemoglobin  13.5 g/dL    12.0-16.0  

 

 Hematocrit  39 %    35-47  

 

 Mean Corpuscular Volume  88 um3    79-97  

 

 Mean Corpuscular Hemoglob  31 pg    27-31  

 

 Mean Corpuscular HGB Cone  35 g/dL    32-36  

 

 Redcell Distribution WDTH  13 %    10.5-15  

 

 Platelet Count  138 CUMM  Low  150-450  

 

 Mean Platelet Volume  11.2 um3  High  7.4-10.4  

 

 Polysegmented Neutrophil  74 %    38-83  

 

 Band Neutrophil  4 %    0-8  

 

 Lymphocyte  18 %  Low  25-47  

 

 Monocyte  4 %    0-13  

 

 Absolute Neutrophil Count  5.3      

 

 RBC Morphology  NORMAL      









 Comp Metabolic Panel  2011  Sodium  138 mmol/L    135-145  









 Potassium  4.3 mmol/L    3.5-5.0  

 

 Chloride  102 mmol/L    101-111  

 

 Co2 (Carbon Dioxide)  30.0 mmol/L    22-32  

 

 Anion Gap  6.0 mmol/L    2-11  42

 

 Glucose  84 mg/dL      

 

 BUN  14 mg/dL    6-24  

 

 Creatinine  0.80 mg/dL    0.50-1.40  

 

 One Over Creatinine  1.20      

 

 BUN/Creatinine Ratio  17.5    8-20  

 

 Calcium  9.8 mg/dL    8.1-9.9  

 

 Total Protein  6.1 GM/DL  Low  6.2-8.1  

 

 Albumin  4.1 GM/DL    3.6-5.4  

 

 Globulin  2.0 GM/DL    2-4  

 

 Albumin/Globulin Ratio  2.1    1-3  

 

 Bilirubin Total  0.5 mg/dL    0.4-1.5  43

 

 Alkaline Phosphatase  51 U/L      

 

 Alt (SGPT)  23 U/L    14-54  

 

 Ast (Sgot)  22 U/L    12-42  

 

 eGFR Non-  75.6    &gt; 60  

 

 eGFR   97.3    &gt; 60  44









 Laboratory test finding  2011  Erythrocyte Sed Rate  9 MM/HR    0-30  

 

 CBC With Manual Diff  2011  White Blood Count  7.1 CUMM    4.8-10.8  









 Red Cell Count  4.36 CUMM    4.2-5.4  

 

 Hemoglobin  13.4 g/dL    12.0-16.0  

 

 Hematocrit  40 %    35-47  

 

 Mean Corpuscular Volume  91 um3    79-97  

 

 Mean Corpuscular Hemoglob  31 pg    27-31  

 

 Mean Corpuscular HGB Cone  34 g/dL    32-36  

 

 Redcell Distribution WDTH  12 %    10.5-15  

 

 Platelet Count  150 CUMM    150-450  

 

 Mean Platelet Volume  11.9 um3  High  7.4-10.4  

 

 Polysegmented Neutrophil  65 %    38-83  

 

 Lymphocyte  25 %    25-47  

 

 Monocyte  9 %    0-13  

 

 Eosinophil  1 %    0-6  

 

 Absolute Neutrophil Count  4.6      

 

 RBC Morphology  NORMAL      









 Laboratory test  2011  C Reactive Protein  &lt; 0.5 mg/dL    Less Than 
0.5  



 finding            









 1  Desirable &lt;150



   Borderline high 150-199



   High 200-499



   Very High &gt;500

 

 2  Desirable &lt;200



   Borderline high 200-239



   High &gt;239

 

 3  Low &lt;40



   Desirable: 40-60



   High: &gt;60

 

 4  Desirable: &lt;100 mg/dL



   Near Optimal: 100-129 mg/dL



   Borderline High: 130-159 mg/dL



   High: 160-189 mg/dL



   Very High: &gt;189 mg/dL

 

 5  *******Because ethnic data is not always readily available,



   this report includes an eGFR for both -Americans and



   non- Americans.****



   The National Kidney Disease Education Program (NKDEP) does



   not endorse the use of the MDRD equation for patients that



   are not between the ages of 18 and 70, are pregnant, have



   extremes of body size, muscle mass, or nutritional status,



   or are non- or non-.



   According to the [employer], irrespective of



   diagnosis, the stage of the disease is based on the level of



   kidney function:



   Stage Description                      GFR(mL/min/1.73 m(2))



   1     Kidney damage with normal or decreased GFR       90



   2     Kidney damage with mild decrease in GFR          60-89



   3     Moderate decrease in GFR                         30-59



   4     Severe decrease in GFR                           15-29



   5     Kidney failure                       &lt;15 (or dialysis)

 

 6  Acute inflammation:  &gt;10.00

 

 7  *******Because ethnic data is not always readily available,



   this report includes an eGFR for both -Americans and



   non- Americans.****



   The National Kidney Disease Education Program (NKDEP) does



   not endorse the use of the MDRD equation for patients that



   are not between the ages of 18 and 70, are pregnant, have



   extremes of body size, muscle mass, or nutritional status,



   or are non- or non-.



   According to the [employer], irrespective of



   diagnosis, the stage of the disease is based on the level of



   kidney function:



   Stage Description                      GFR(mL/min/1.73 m(2))



   1     Kidney damage with normal or decreased GFR       90



   2     Kidney damage with mild decrease in GFR          60-89



   3     Moderate decrease in GFR                         30-59



   4     Severe decrease in GFR                           15-29



   5     Kidney failure                       &lt;15 (or dialysis)

 

 8  Acute inflammation:  &gt;10.00

 

 9  *******Because ethnic data is not always readily available,



   this report includes an eGFR for both -Americans and



   non- Americans.****



   The National Kidney Disease Education Program (NKDEP) does



   not endorse the use of the MDRD equation for patients that



   are not between the ages of 18 and 70, are pregnant, have



   extremes of body size, muscle mass, or nutritional status,



   or are non- or non-.



   According to the National Kidney Foundation, irrespective of



   diagnosis, the stage of the disease is based on the level of



   kidney function:



   Stage Description                      GFR(mL/min/1.73 m(2))



   1     Kidney damage with normal or decreased GFR       90



   2     Kidney damage with mild decrease in GFR          60-89



   3     Moderate decrease in GFR                         30-59



   4     Severe decrease in GFR                           15-29



   5     Kidney failure                       &lt;15 (or dialysis)

 

 10  Acute inflammation:  &gt;10.00

 

 11  RUN DATE: 05/02/15                LAB **LIVE**       
         PAGE    1



   RUN TIME: 0811             [address] [place] [address]   [address]



   Specimen Inquiry



   



   -----------------------------------------------------------------------------
---------------



   Name:  ADARSH FAIRCHILD                     : 1960    Attend Dr: Paco Alcocer NP



   Acct:  [id number]  Unit: [id number]  AGE: 54            Location:  [facility]



   Re/30/15                        SEX: F             Status:    REG REF



   -----------------------------------------------------------------------------
---------------



   



   SPEC: [id number]         ANTONIO:   04/30/    SUBM DR: Paco Alcocer NP



   REQ:  [id number]              RECD:   04/30/



   STATUS: COMP



   _



   SOURCE: THROAT         SPDESC:



   ORDERED:  Throat Culture



   QUERIES:  Provider Requisition # [id number]



   



   -----------------------------------------------------------------------------
---------------



   Procedure                         Result                         Verified   
        Site



   -----------------------------------------------------------------------------
---------------



   Throat Culture  Final                                            05/02/15-
811      ML



   



   Organism 1                     NORMAL MICHELLE



   Quantity                    3+



   



   -----------------------------------------------------------------------------
---------------



   * ML - MAIN LAB (PSC1)



   .



   



   



   



   



   



   



   



   



   



   



   



   



   



   



   



   



   



   



   



   



   



   



   



   



   ** END OF REPORT **



   



   * ML=Testing performed at Main Lab



   DEPARTMENT OF PATHOLOGY,  [address] [place] [address]



   Phone # [phone]      Fax #[phone]



   Casimiro Hall M.D. Director     [facility] # [id number]

 

 12  *******Because ethnic data is not always readily available,



   this report includes an eGFR for both -Americans and



   non- Americans.****



   The National Kidney Disease Education Program (NKDEP) does



   not endorse the use of the MDRD equation for patients that



   are not between the ages of 18 and 70, are pregnant, have



   extremes of body size, muscle mass, or nutritional status,



   or are non- or non-.



   According to the National Kidney Foundation, irrespective of



   diagnosis, the stage of the disease is based on the level of



   kidney function:



   Stage Description                      GFR(mL/min/1.73 m(2))



   1     Kidney damage with normal or decreased GFR       90



   2     Kidney damage with mild decrease in GFR          60-89



   3     Moderate decrease in GFR                         30-59



   4     Severe decrease in GFR                           15-29



   5     Kidney failure                       &lt;15 (or dialysis)

 

 13  Acute inflammation:  &gt;10.00

 

 14  Normal Range 180 to 914



   Indeterminate Range 145 to 180



   Deficient Range  &lt;145

 

 15  *******Because ethnic data is not always readily available,



   this report includes an eGFR for both -Americans and



   non- Americans.****



   The National Kidney Disease Education Program (NKDEP) does



   not endorse the use of the MDRD equation for patients that



   are not between the ages of 18 and 70, are pregnant, have



   extremes of body size, muscle mass, or nutritional status,



   or are non- or non-.



   According to the National Kidney Foundation, irrespective of



   diagnosis, the stage of the disease is based on the level of



   kidney function:



   Stage Description                      GFR(mL/min/1.73 m(2))



   1     Kidney damage with normal or decreased GFR       90



   2     Kidney damage with mild decrease in GFR          60-89



   3     Moderate decrease in GFR                         30-59



   4     Severe decrease in GFR                           15-29



   5     Kidney failure                       &lt;15 (or dialysis)

 

 16  Acute inflammation:  &gt;10.00

 

 17  Desirable &lt;150



   Borderline high 150-199



   High 200-499



   Very High &gt;500

 

 18  Desirable &lt;200



   Borderline high 200-239



   High &gt;239

 

 19  Low &lt;40



   Desirable: 40-60



   High: &gt;60

 

 20  Desirable &lt;100



   Near Optimal 100-129



   Borderline high 130-159



   High 160-189



   Very High &gt;189

 

 21  Acute inflammation:  &gt;10.00

 

 22  *******Because ethnic data is not always readily available,



   this report includes an eGFR for both -Americans and



   non- Americans.****



   The National Kidney Disease Education Program (NKDEP) does



   not endorse the use of the MDRD equation for patients that



   are not between the ages of 18 and 70, are pregnant, have



   extremes of body size, muscle mass, or nutritional status,



   or are non- or non-.



   According to the National Kidney Foundation, irrespective of



   diagnosis, the stage of the disease is based on the level of



   kidney function:



   Stage Description                      GFR(mL/min/1.73 m(2))



   1     Kidney damage with normal or decreased GFR       90



   2     Kidney damage with mild decrease in GFR          60-89



   3     Moderate decrease in GFR                         30-59



   4     Severe decrease in GFR                           15-29



   5     Kidney failure                       &lt;15 (or dialysis)

 

 23  Test Performed by:



   [place] [address]



   : Matheus Harman III, M.D.

 

 24  RESULT: No apparent monoclonal protein on serum electrophoresis.



   Test Performed by:



   [place] [address]



   : Matheus Harman III, M.D.

 

 25  The above TERRI screen is designed for the detection of



   antibodies to extractable nuclear antigen (TERRI) in human



   serum. It is a combination test for the detection of



   antibodies to RNP, Sm, SS-A (Ro), and SS-B (La)



   nuclear antigens.

 

 26  Desirable &lt;150



   Borderline high 150-199



   High 200-499



   Very High &gt;500

 

 27  Desirable &lt;200



   Borderline high 200-239



   High &gt;239

 

 28  Low &lt;40



   Desirable: 40-60



   High: &gt;60

 

 29  Desirable &lt;100



   Near Optimal 100-129



   Borderline high 130-159



   High 160-189



   Very High &gt;189

 

 30  -- REFERENCE VALUE --



   25-HYDROXY D TOTAL (D2+D3) Optimum levels in the healthy



   population are 20-50, patients with bone disease may



   benefit from higher levels within this range.



   Test Performed by:



   [facility] - [address] [address]



   : Matheus Harman III, M.D.

 

 31  NON-FASTING

 

 32  CONFIRMED BY ESTIMATE ON BLOOD SMEAR

 

 33  @14 1851: Slide Review added. RFLXG=SR.

 

 34  *******Because ethnic data is not always readily available,



   this report includes an eGFR for both -Americans and



   non- Americans.****



   The National Kidney Disease Education Program (NKDEP) does



   not endorse the use of the MDRD equation for patients that



   are not between the ages of 18 and 70, are pregnant, have



   extremes of body size, muscle mass, or nutritional status,



   or are non- or non-.



   According to the National Kidney Foundation, irrespective of



   diagnosis, the stage of the disease is based on the level of



   kidney function:



   Stage Description                      GFR(mL/min/1.73 m(2))



   1     Kidney damage with normal or decreased GFR       90



   2     Kidney damage with mild decrease in GFR          60-89



   3     Moderate decrease in GFR                         30-59



   4     Severe decrease in GFR                           15-29



   5     Kidney failure                       &lt;15 (or dialysis)

 

 35  Platelet count confirmed by smear estimate.

 

 36  *******Because ethnic data is not always readily available,



   this report includes an eGFR for both -Americans and



   non- Americans.****



   The National Kidney Disease Education Program (NKDEP) does



   not endorse the use of the MDRD equation for patients that



   are not between the ages of 18 and 70, are pregnant, have



   extremes of body size, muscle mass, or nutritional status,



   or are non- or non-.



   According to the National Kidney Foundation, irrespective of



   diagnosis, the stage of the disease is based on the level of



   kidney function:



   Stage Description                      GFR(mL/min/1.73 m(2))



   1     Kidney damage with normal or decreased GFR       90



   2     Kidney damage with mild decrease in GFR          60-89



   3     Moderate decrease in GFR                         30-59



   4     Severe decrease in GFR                           15-29



   5     Kidney failure                       &lt;15 (or dialysis)

 

 37  *******Because ethnic data is not always readily available,



   this report includes an eGFR for both -Americans and



   non- Americans.****



   The National Kidney Disease Education Program (NKDEP) does



   not endorse the use of the MDRD equation for patients that



   are not between the ages of 18 and 70, are pregnant, have



   extremes of body size, muscle mass, or nutritional status,



   or are non- or non-.



   According to the National Kidney Foundation, irrespective of



   diagnosis, the stage of the disease is based on the level of



   kidney function:



   Stage Description                      GFR(mL/min/1.73 m(2))



   1     Kidney damage with normal or decreased GFR       90



   2     Kidney damage with mild decrease in GFR          60-89



   3     Moderate decrease in GFR                         30-59



   4     Severe decrease in GFR                           15-29



   5     Kidney failure                       &lt;15 (or dialysis)

 

 38  PLEASE NOTE NEW REFERENCE RANGE.

 

 39  Anion gap measurement may be of limited value in the



   presence of any alkalosis, especially in a combined acid



   base disorder.



   .

 

 40  A metabolite of Naproxen, O-desmethylnaproxen, has been



   shown to interfere with the Jendrassik-Cristopher method for



   measuring total bilirubin.  Samples from patients who have



   taken Naproxen have shown spurious elevation in total



   bilirubin levels.

 

 41  *******Because ethnic data is not always readily available,



   this report includes an eGFR for both -Americans and



   non- Americans.****



   The National Kidney Disease Education Program (NKDEP) does



   not endorse the use of the MDRD equation for patients that



   are not between the ages of 18 and 70, are pregnant, have



   extremes of body size, muscle mass, or nutritional status,



   or are non- or non-.



   According to the National Kidney Foundation, irrespective of



   diagnosis, the stage of the disease is based on the level of



   kidney function:



   Stage Description                      GFR(mL/min/1.73 m(2))



   1     Kidney damage with normal or decreased GFR       90



   2     Kidney damage with mild decrease in GFR          60-89



   3     Moderate decrease in GFR                         30-59



   4     Severe decrease in GFR                           15-29



   5     Kidney failure                       &lt;15 (or dialysis)

 

 42  Anion gap measurement may be of limited value in the



   presence of any alkalosis, especially in a combined acid



   base disorder.



   .

 

 43  A metabolite of Naproxen, O-desmethylnaproxen, has been



   shown to interfere with the Jendrassik-Cristopher method for



   measuring total bilirubin.  Samples from patients who have



   taken Naproxen have shown spurious elevation in total



   bilirubin levels.

 

 44  *******Because ethnic data is not always readily available,



   this report includes an eGFR for both -Americans and



   non- Americans.****



   The National Kidney Disease Education Program (NKDEP) does



   not endorse the use of the MDRD equation for patients that



   are not between the ages of 18 and 70, are pregnant, have



   extremes of body size, muscle mass, or nutritional status,



   or are non- or non-.



   According to the National Kidney Foundation, irrespective of



   diagnosis, the stage of the disease is based on the level of



   kidney function:



   Stage Description                      GFR(mL/min/1.73 m(2))



   1     Kidney damage with normal or decreased GFR       90



   2     Kidney damage with mild decrease in GFR          60-89



   3     Moderate decrease in GFR                         30-59



   4     Severe decrease in GFR                           15-29



   5     Kidney failure                       &lt;15 (or dialysis)







Procedures







 Date  CPT Code  Description  Status

 

 201710  Inject/Drain Joint/Bursa Major  Completed

 

 2017  94024  Inject/Drain Joint/Bursa Major  Completed

 

 2017  60842  Inject/Drain Joint/Bursa Major  Completed

 

 2017  68156  Inject/Drain Joint/Bursa Major  Completed

 

 2014  50874  Destruction Of Benign Lesions Any Method 1-14 lesions  
Completed

 

 2014  65482  EKG Tracing &amp; Interpretation  Completed

 

 2014  32623  Destruction Of Benign Lesions Any Method 1-14 lesions  
Completed

 

 2010    Colonoscopy  Completed

 

 2010  62092  Dest Lesion Each Addl Lesion 2 Through 14 Each  Completed

 

 2010  33759  Destruction ALL Benign Or Premalignant Lesion (Other  
Completed



     Than [place]  

 

 2006    Mammogram  Completed

 

 2003  31046  Color Doppler  Completed

 

 2003  18880  Pulse Doppler &amp; Continuous Wave  Completed

 

 2003  94775  Echocardiogram  Completed







Encounters







 Type  Date  Location  Provider  CPT E/M  Dx

 

 Office Visit  2017  [facility]  Laure Jeter M.D.  44713  
M25.561



   10:45a  Of [facility]      









 M25.461

 

 M17.31









 Office Visit  2017  3:00p  [facility]  [facility]  
75213  M45.7



     [place]      









 M25.561

 

 Z79.899









 Office Visit  2017  4:00p  [facility]  Mali Antonio,  
33385  Z00.00



     - Christal LEARY    









 E04.9









 Office Visit  2017 10:00a  [facility]  Laure Jeter M.D.  
41436  M25.561



     [facility]      









 M25.461

 

 M17.31









 Office Visit  2017 11:40a  [facility]  Mali Antonio,  
64805  I83.12



     - Christal LEARY    









 R03.0

 

 E78.5









 Office Visit  2017  2:00p  [facility]  [facility]  
71348  M45.7



     [facility]      









 R76.8

 

 M48.07

 

 Z79.899

 

 R21









 Office Visit  2016  4:00p  [facility]  Radha Jackson,  [address]  
J45.901



     - Christal  N.P.    

 

 Office Visit  2016  3:30p  [facility]  [facility]  
90941  M45.7



     Of [facility]      









 R76.8

 

 M75.82









 Office Visit  2016  4:00p  [facility]  Mali Antonio,  
54718  Z00.00



     - Christal LEARY    









 Z12.11









 Office Visit  10/13/2015  3:00p  [facility]  [facility]  
13983  M45.7



     [facility]      









 M48.06

 

 M75.82

 

 Z79.899









 Office Visit  07/10/2015 10:00a  [facility]  [facility]  
42875  724.02



     Of [facility]      









 720.0

 

 726.19

 

 995.3









 Office Visit  2015  4:00p  [facility] -  Paco Alcocer NP  
30681  472.1



     [place]      









 780.79









 Office Visit  2015  2:40p  [facility]  Honorio Shah  66376  
724.02



     Of [place] LEARY    









 720.0

 

 726.19

 

 338.4









 Office Visit  2015  2:00p  [facility]  Paco Alcocer NP  78816  
461.8



     - [place]      

 

 Office Visit  2014  3:00p  [facility]  Radha Jackson N.P.  
23605  078.10



     - [place]      









 110.1









 Office Visit  2014  2:00p  [facility]  Honorio Shah,  65040  
724.02



     Of [place]  M.D.    









 720.0

 

 726.19

 

 995.3









 Office Visit  10/07/2014  2:40p  [facility]  Honorio Shah,  83530  
724.02



     Of [place]  MMimiDMimi    









 720.0

 

 726.19

 

 995.3









 Office Visit  2014 11:40a  [facility]  Mali Antonio  
37992  401.1



     - Christal LEARY    









 078.12









 Office Visit  2014 10:40a  [facility]  Honorio Shah,  88623  
724.02



     Of Gurmeet WINSTON.DMimi    









 720.0

 

 726.19

 

 272.4

 

 721.0









 Office Visit  2014  2:40p  [facility]  Honorio Shah,  93512  
724.02



     Of [place]  M.DMimi    









 720.0

 

 683

 

 726.19









 Office Visit  2014  2:20p  [facility]  Honorio Shah M.D.  
52672  720.0



     Of [facility]      









 724.02

 

 V58.69

 

 683

 

 726.19









 Office Visit  2014 11:40a  [facility] -  Leonor Gonzales M.D.,  
64070  V70.0



     [place]  FACP    









 V76.10

 

 V73.89

 

 726.19

 

 078.12

 

 401.1

 

 272.0

 

 268.9

 

 V06.1









 Office Visit  2014  2:40p  [facility]  Honorio Shah,  04201  
724.02



     Of [place]  M.DMimi    









 720.0

 

 V58.69

 

 840.4

 

 472.1

 

 726.19









 Office Visit  2013  3:00p  [facility] -  Marcela Bonilla M.D.  
22806  784.1



     [place]      

 

 Office Visit  2013  2:40p  [facility]  Honorio Shah,  70106  
724.02



     Of Gurmeet LEARY    









 720.0

 

 V58.69









 Office Visit  2013  4:20p  [facility]  Honorio Shah M.D.  
47731  720.0



     Of [facility]      









 724.02









 Office Visit  2012  3:40p  [facility] -  Leonor Gonzales M.D.,  
42742  726.19



     [place]  FACP    

 

 Office Visit  2012  3:40p  [facility] -  Leonor Gonzales M.D.,  
51219  726.19



     [place]  FACP    

 

 Office Visit  2012  9:15a  [facility]  Balaji Benson M.D.  
63956  840.9



     [facility]      

 

 Office Visit  2011  3:00p  DO Not Use  Leonor Gonzales M.D.,  34869  726.19



     [facility]  FACP    

 

 Office Visit  10/10/2011 11:20a  DO Not Use  Leonor Gonzales M.D.,  84962  728.85



     [facility]  FACP    

 

 Office Visit  2011  4:00p  DO Not Use  Leonor Gonzales M.D.,  42427  729.5



     [facility]  FACP    

 

 Office Visit  2011  2:00p  DO Not Use  Radha Varn,  29585  789.02



     [facility]  N.P.    

 

 Office Visit  2011  4:20p  [facility]  Honorio Shah,  30787  
287.5



     Of [facility]  M.D.    









 720.0

 

 721.3









 Office Visit  2011  3:30p  DO Not Use [facility]  Radha Varn,  
26379  311



       N.P.    

 

 Office Visit  2010  1:15p  DO Not Use [facility]  Radha Varn,  
94705  401.1



       N.P.    









 311

 

 461.9









 Office Visit  2010  1:30p  DO Not Use  Radha Varn,  84539  V70.0



     [facility]  N.P.    

 

 Office Visit  2010  3:45p  DO Not Use  Radha Varn,  79817  078.10



     [facility]  N.P.    









 729.5









 Office Visit  2010  4:00p  DO Not Use [facility]  Radha Varn,  
08848  455.6



       N.P.    

 

 Office Visit  2010  3:00p  DO Not Use [facility]  Leonor Gonzales M.D.,  
95156  528.2



       FACP    

 

 Office Visit  2009  4:00p  DO Not Use [facility]  Nova Razo,  
74116  461.9



       M.D.    









 462









 Office Visit  2008  2:45p  DO Not Use [facility]  Leonor Gonzales,  48101
  721.90



       M.D., FACP    

 

 Office Visit  10/03/2008  3:30p  DO Not Use [facility]  Leonor Gonzales,  65910
  724.2



       M.D., FACP    









 333.1









 Office Visit  10/25/2007 11:45a  DO Not Use  Radha Jackson,  91513  787.01



     [facility]  N.P.    









 558.9







Plan of Care

Future Appointment(s):03/15/2018 10:30 am - Cecil Gentile PA-C at [facility].03/15/2018 10:30 am - ANDRE Armstrong at [facility].2018  8:30 am - Laure Jeter M.D. at [facility].03/15/2018 10:30 am - Laure Jeter M.D. at [facility]2018 - Paco Alcocer, NPZ01.818 Encounter for other 
preprocedural examinationNew Orders:EKGComments:As long as your labs are normal 
I do not see any contraindications to your surgery.You should avoid aspirin, 
NSAIDs (ibuprofen, Motrin, aleve) and supplements 7 days prior to the 
procedure.M25.561 Pain in right kneeJ45.901 Unspecified asthma with (acute) 
pkjgigdhtizkD09.9 Anxiety disorder, unspecifiedComments:I have prescribed the 
Buspar that we discussed. Start taking that once daily for three days and 
thenincrease to twice daily. You can increase by 2.5mg (1/2 tablet) twice daily 
every three days until you are taking 10mg twice daily.Follow up:3 months and 
PRN Lvm for pt to let her know that her imaging is normal and we will see her at her set appt in June   I told her to call back w any questions or concerns

## 2025-04-28 NOTE — TELEPHONE ENCOUNTER
----- Message from Denton Woodson sent at 4/28/2025 10:12 AM EDT -----  MRI normal, will follow up in June.   ----- Message -----  From: Precious James MA  Sent: 4/28/2025   8:08 AM EDT  To: RAJESH Harrison      ----- Message -----  From: Interface, Rad Results Whitney In  Sent: 4/26/2025   8:48 AM EDT  To: [facility] Evelia Manager Results Routin#

## 2025-05-15 ENCOUNTER — OFFICE VISIT (OUTPATIENT)
Dept: INTERNAL MEDICINE | Facility: CLINIC | Age: 65
End: 2025-05-15
Payer: COMMERCIAL

## 2025-05-15 DIAGNOSIS — Z00.00 ANNUAL PHYSICAL EXAM: Primary | ICD-10-CM

## 2025-05-15 DIAGNOSIS — Z12.11 COLON CANCER SCREENING: ICD-10-CM

## 2025-05-15 DIAGNOSIS — Z23 NEED FOR PNEUMOCOCCAL VACCINE: ICD-10-CM

## 2025-05-15 DIAGNOSIS — Z13.1 SCREENING FOR DIABETES MELLITUS: ICD-10-CM

## 2025-05-15 DIAGNOSIS — E78.00 PURE HYPERCHOLESTEROLEMIA: ICD-10-CM

## 2025-05-15 RX ORDER — ESTRADIOL 0.1 MG/D
1 FILM, EXTENDED RELEASE TRANSDERMAL 2 TIMES WEEKLY
COMMUNITY
Start: 2025-04-19

## 2025-05-15 RX ORDER — ESTRADIOL 0.1 MG/G
CREAM VAGINAL
COMMUNITY
Start: 2025-04-25

## 2025-05-15 NOTE — LETTER
[facility]  Vaccine Consent Form    Patient Name:  Beronica SUMMERS Mock  Patient :  1960        Screening Checklist  The following questions should be completed prior to vaccination. If you answer “yes” to any question, it does not necessarily mean you should not be vaccinated. It just means we may need to clarify or ask more questions. If a question is unclear, please ask your healthcare provider to explain it.    Yes No   Any fever or moderate to severe illness today (mild illness and/or antibiotic treatment are not contraindications)?     Do you have a history of a serious reaction to any previous vaccinations, such as anaphylaxis, encephalopathy within 7 days, Guillain-[place] syndrome within 6 weeks, seizure?     Have you received any live vaccine(s) (e.g MMR, WOLFGANG) or any other vaccines in the last month (to ensure duplicate doses aren't given)?     Do you have an anaphylactic allergy to latex (DTaP, DTaP-IPV, Hep A, Hep B, MenB, RV, Td, Tdap), baker’s yeast (Hep B, HPV), polysorbates (RSV, nirsevimab, PCV 20, Rotavirrus, Tdap, Shingrix), or gelatin (WOLFGANG, MMR)?     Do you have an anaphylactic allergy to neomycin (Rabies, WOLFGANG, MMR, IPV, Hep A), polymyxin B (IPV), or streptomycin (IPV)?      Any cancer, leukemia, AIDS, or other immune system disorder? (WOLFGANG, MMR, RV)     Do you have a parent, brother, or sister with an immune system problem (if immune competence of vaccine recipient clinically verified, can proceed)? (MMR, WOLFGANG)     Any recent steroid treatments for >2 weeks, chemotherapy, or radiation treatment? (WOLFGANG, MMR)     Have you received antibody-containing blood transfusions or IVIG in the past 11 months (recommended interval is dependent on product)? (MMR, WOLFGANG)     Have you taken antiviral drugs (acyclovir, famciclovir, valacyclovir for WOLFGANG) in the last 24 or 48 hours, respectively?      Are you pregnant or planning to become pregnant within 1 month? (WOLFGANG, MMR, HPV, IPV, MenB, Abrexvy; For Hep B- refer  "to Engerix-B; For RSV - Abrysvo is indicated for 32-36 weeks of pregnancy from September to January)     For infants, have you ever been told your child has had intussusception or a medical emergency involving obstruction of the intestine (Rotavirus)? If not for an infant, can skip this question.         *Ordering Physicians/APC should be consulted if \"yes\" is checked by the patient or guardian above.  I have received, read, and understand the Vaccine Information Statement (VIS) for each vaccine ordered.  I have considered my or my child's health status as well as the health status of my close contacts.  I have taken the opportunity to discuss my vaccine questions with my or my child's health care provider.   I have requested that the ordered vaccine(s) be given to me or my child.  I understand the benefits and risks of the vaccines.  I understand that I should remain in the clinic for 15 minutes after receiving the vaccine(s).  _________________________________________________________  Signature of Patient or Parent/Legal Guardian ____________________  Date   "

## 2025-05-15 NOTE — PROGRESS NOTES
Ulises Keller DO, FACP  Internal Medicine  [facility]  [address], [address]  [place] [address]  [phone]    Chief Complaint  Annual checkup    HISTORY    Beronica Price is a 64 y.o. female who presents to the office today as a  an established patient for their annual preventative exam.     No hospitalization(s) within the last year.     Current exercise regimen: goes to the gym 2-3 times per week for weightlifting.  She walks twice daily every day with her dog.     Status of chronic medical conditions:    HLD: states she has a good diet, doesn't eat out much  Lab Results   Component Value Date    CHLPL 178 08/19/2021    TRIG 79 08/19/2021    HDL 58 08/19/2021     (H) 08/19/2021     Post Menopausal symptoms: takes estradiol patch and medroxyprogesterone tablet, prescribed by Dr Reynaga     High risk of breast cancer: follows with [facility] breast surgery with yearly mammograms with supplemental MRIs    Patient's overall comments about their health or any other specific health concerns they report:    GYN History:     *Patient's GYN Practice:  [facility]     *post menopausal     *Previous pregnancies: 2.  Live births: 2.  Miscarriages: 0 . Elective abortions: 0.      Health Maintenance Summary            Current Care Gaps       DXA SCAN (Every 2 Years) Overdue since 10/12/2019      10/12/2017  SCANNED - DEXA              COVID-19 Vaccine (3 - 2024-25 season) Overdue since 9/1/2024 06/16/2021  Imm Admin: COVID-19 (PFIZER) Purple Cap Monovalent    05/15/2021  Imm Admin: COVID-19 (PFIZER) Purple Cap Monovalent              TDAP/TD VACCINES (1 - Tdap) Never done     No completion history exists for this topic.              Pneumococcal Vaccine 50+ (1 of 1 - PCV) Never done     No completion history exists for this topic.              ZOSTER VACCINE (1 of 2) Never done     No completion history exists for this topic.              COLORECTAL CANCER SCREENING (COLOGUARD - Every  3 Years) Due soon on 9/12/2025 09/12/2022  Cologuard component of Cologuard - Stool, Per Rectum    02/08/2019  SCANNED - COLOGUARD    02/07/2019  Cologuard - ,                      Upcoming       INFLUENZA VACCINE (Yearly - July to March) Next due on 7/1/2025      10/05/2020  Imm Admin: Flu Vaccine Quad PF >36MO    01/31/2019  Imm Admin: Influenza, Unspecified    10/12/2017  Imm Admin: Flu Vaccine Quad PF >36MO    02/02/2017  Imm Admin: Influenza, Unspecified              ANNUAL PHYSICAL (Yearly) Next due on 5/15/2026      05/15/2025  Done    09/08/2022  Done    08/19/2021  Done              MAMMOGRAM (Every 2 Years) Next due on 4/25/2027 04/25/2025  MAMMO Scan    09/13/2022  Mammo Diagnostic Digital Tomosynthesis Bilateral With CAD    07/21/2021  SCANNED - MAMMO    07/21/2021  SCANNED - MAMMO    07/21/2021  SCANNED - MAMMO     Only the first 5 history entries have been loaded, but more history exists.                    Completed or No Longer Recommended       HEPATITIS C SCREENING  Completed      09/08/2022  Hep C Virus Ab component of Hepatitis C antibody                             Allergies   Allergen Reactions    Gentamycin [Gentamicin] Hives    Penicillins Hives        Outpatient Medications Marked as Taking for the 5/15/25 encounter (Office Visit) with Ulises Keller,    Medication Sig Dispense Refill    estradiol (MINIVELLE, VIVELLE-DOT) 0.075 MG/24HR patch       Magnesium 200 MG tablet Take 1 tablet by mouth.      medroxyPROGESTERone (PROVERA) 2.5 MG tablet TK 1 T PO  D  11    Omega-3 1000 MG capsule Take 1 capsule by mouth.      thiamine (VITAMIN B-1) 100 MG tablet  tablet Take 1 tablet by mouth Daily.      vitamin D3 125 MCG (5000 UT) capsule capsule Take 1 capsule by mouth Daily.          Past Medical History:   Diagnosis Date    Bilateral breast cysts     Dense breasts     Hyperlipidemia     Multiple thyroid nodules     Transaminitis     Uterine polyp      Past Surgical History:   Procedure  "Laterality Date    ENDOMETRIAL ABLATION  2003    MASTOIDECTOMY      TONSILLECTOMY       Family History   Problem Relation Age of Onset    Hypertension Mother         not currently requiring treatment    Asthma Mother     Hypothyroidism Mother     Atrial fibrillation Father     Heart block Father     Heart failure Father     Multiple sclerosis Sister     Breast cancer Sister     No Known Problems Brother     Coronary artery disease Brother 63    Uterine cancer Maternal Grandmother 48    Prostate cancer Maternal Grandfather     Heart block Paternal Grandmother     reports that she has never smoked. She has never used smokeless tobacco. She reports that she does not currently use alcohol. She reports that she does not use drugs.    Immunization History   Administered Date(s) Administered    COVID-19 (PFIZER) Purple Cap Monovalent 05/15/2021, 06/16/2021    Flu Vaccine Quad PF >36MO 10/12/2017, 10/05/2020    Influenza, Unspecified 02/02/2017, 01/31/2019        OBJECTIVE    Vital Signs:   /62   Pulse 76   Temp 98.2 °F (36.8 °C) (Infrared)   Ht 172.7 cm (68\")   Wt 76.7 kg (169 lb)   SpO2 98%   BMI 25.70 kg/m²     Physical Exam  Vitals reviewed.   Constitutional:       General: She is not in acute distress.     Appearance: Normal appearance. She is not ill-appearing.   HENT:      Head: Normocephalic and atraumatic.      Right Ear: Tympanic membrane, ear canal and external ear normal. There is no impacted cerumen.      Left Ear: Tympanic membrane, ear canal and external ear normal. There is no impacted cerumen.      Mouth/Throat:      Mouth: Mucous membranes are moist.      Pharynx: No oropharyngeal exudate or posterior oropharyngeal erythema.   Eyes:      General: No scleral icterus.     Extraocular Movements: Extraocular movements intact.      Conjunctiva/sclera: Conjunctivae normal.      Pupils: Pupils are equal, round, and reactive to light.   Cardiovascular:      Rate and Rhythm: Normal rate and regular " rhythm.      Heart sounds: Normal heart sounds. No murmur heard.  Pulmonary:      Effort: Pulmonary effort is normal. No respiratory distress.      Breath sounds: Normal breath sounds. No wheezing.   Abdominal:      General: Bowel sounds are normal. There is no distension.      Palpations: Abdomen is soft.      Tenderness: There is no abdominal tenderness. There is no guarding.   Musculoskeletal:      Cervical back: Neck supple.      Right lower leg: No edema.      Left lower leg: No edema.   Lymphadenopathy:      Cervical: No cervical adenopathy.   Skin:     General: Skin is warm and dry.      Coloration: Skin is not jaundiced.   Neurological:      General: No focal deficit present.      Mental Status: She is alert and oriented to person, place, and time.      Cranial Nerves: No cranial nerve deficit.      Motor: No weakness.   Psychiatric:         Mood and Affect: Mood normal.         Behavior: Behavior normal.         Thought Content: Thought content normal.                             ASSESSMENT & PLAN     #Annual Preventative Health Examination   -Age and sex appropriate physical exam performed and documented. Updated past medical, family, social and surgical histories as well as allergies and care team list. Addressed care gaps listed in the medical record.  -Encouraged annual dental and vision exams as part of their overall health.  -Encouraged minimum of 30 minutes or more of exercise at a brisk walk or higher 5 days per week combined with a well-balanced diet.  -Immunizations reviewed and updated in EMR. Td, Influenza, Prevnar 20 (Pneumococcal 20-valent conjugate), Shingrix, and COVID19 recommended.  -Lipid screening:  Patient is over age 40 and a 10-year ASCVD risk was calculated which does not indicate a need for statin therapy. Repeat today for continued monitoring .   -Aspirin for primary or secondary prevention: ASCVD risk calculate and aspirin for primary prevention is not indicated.  -Depression and  Anxiety screening: Patient denies symptom of anxiety or depression.  -Diabetes screening:  Patient is 35-70 years of age and overweight, screening for diabetes is indicated every 3 years. Screening is not up to date and will be updated today.   -Tobacco use screening: Conducted and addressed if indicated.   -Alcohol use screening: Conducted and addressed if indicated.   -Illicit drug screening: Conducted and addressed if indicated.   -Hypertension screening: Patient screened negative for HTN today.  -HIV screening: pt declined  -Syphilis screening: Syphilis screening not indicated.  -Hepatitis B virus screening: Screening not indicated, not in a high-risk group.  -Hepatitis C virus screening:  Patient has already completed Hepatitis C screening. Negative screening on file.   -Colon cancer screening:    Negative cologuard 2022, due for repeat cologuard. Will repeat today.  -Lung cancer screening: Patient has never smoked.  -Cervical cancer screening:  negative pap 10/2024  -Breast cancer screening: up to date , follow with breast surgery  -Osteoporosis screening: will attempt to get results from [facility] .    Follow up in 1 year for annual physical exam.    Patient/family had no further questions at this time and verbalized understanding of the plan discussed today.

## 2025-05-16 ENCOUNTER — TELEPHONE (OUTPATIENT)
Dept: INTERNAL MEDICINE | Facility: CLINIC | Age: 65
End: 2025-05-16

## 2025-05-16 VITALS
SYSTOLIC BLOOD PRESSURE: 100 MMHG | HEART RATE: 76 BPM | BODY MASS INDEX: 24.95 KG/M2 | DIASTOLIC BLOOD PRESSURE: 62 MMHG | TEMPERATURE: 98.2 F | OXYGEN SATURATION: 98 % | HEIGHT: 68 IN | WEIGHT: 164.6 LBS

## 2025-05-16 LAB
ALBUMIN SERPL-MCNC: 4.2 G/DL (ref 3.5–5.2)
ALBUMIN/GLOB SERPL: 1.8 G/DL
ALP SERPL-CCNC: 69 U/L (ref 39–117)
ALT SERPL-CCNC: 21 U/L (ref 1–33)
AST SERPL-CCNC: 15 U/L (ref 1–32)
BASOPHILS # BLD AUTO: 0.05 10*3/MM3 (ref 0–0.2)
BASOPHILS NFR BLD AUTO: 0.6 % (ref 0–1.5)
BILIRUB SERPL-MCNC: 0.3 MG/DL (ref 0–1.2)
BUN SERPL-MCNC: 13 MG/DL (ref 8–23)
BUN/CREAT SERPL: 21 (ref 7–25)
CALCIUM SERPL-MCNC: 9.4 MG/DL (ref 8.6–10.5)
CHLORIDE SERPL-SCNC: 103 MMOL/L (ref 98–107)
CHOLEST SERPL-MCNC: 162 MG/DL (ref 0–200)
CO2 SERPL-SCNC: 25.9 MMOL/L (ref 22–29)
CREAT SERPL-MCNC: 0.62 MG/DL (ref 0.57–1)
EGFRCR SERPLBLD CKD-EPI 2021: 99 ML/MIN/1.73
EOSINOPHIL # BLD AUTO: 0.07 10*3/MM3 (ref 0–0.4)
EOSINOPHIL NFR BLD AUTO: 0.8 % (ref 0.3–6.2)
ERYTHROCYTE [DISTWIDTH] IN BLOOD BY AUTOMATED COUNT: 11.8 % (ref 12.3–15.4)
GLOBULIN SER CALC-MCNC: 2.3 GM/DL
GLUCOSE SERPL-MCNC: 86 MG/DL (ref 65–99)
HBA1C MFR BLD: 5.5 % (ref 4.8–5.6)
HCT VFR BLD AUTO: 43 % (ref 34–46.6)
HDLC SERPL-MCNC: 58 MG/DL (ref 40–60)
HGB BLD-MCNC: 14.5 G/DL (ref 12–15.9)
IMM GRANULOCYTES # BLD AUTO: 0.02 10*3/MM3 (ref 0–0.05)
IMM GRANULOCYTES NFR BLD AUTO: 0.2 % (ref 0–0.5)
LDLC SERPL CALC-MCNC: 88 MG/DL (ref 0–100)
LYMPHOCYTES # BLD AUTO: 2.22 10*3/MM3 (ref 0.7–3.1)
LYMPHOCYTES NFR BLD AUTO: 25.6 % (ref 19.6–45.3)
MCH RBC QN AUTO: 31.7 PG (ref 26.6–33)
MCHC RBC AUTO-ENTMCNC: 33.7 G/DL (ref 31.5–35.7)
MCV RBC AUTO: 94.1 FL (ref 79–97)
MONOCYTES # BLD AUTO: 0.72 10*3/MM3 (ref 0.1–0.9)
MONOCYTES NFR BLD AUTO: 8.3 % (ref 5–12)
NEUTROPHILS # BLD AUTO: 5.58 10*3/MM3 (ref 1.7–7)
NEUTROPHILS NFR BLD AUTO: 64.5 % (ref 42.7–76)
NRBC BLD AUTO-RTO: 0 /100 WBC (ref 0–0.2)
PLATELET # BLD AUTO: 265 10*3/MM3 (ref 140–450)
POTASSIUM SERPL-SCNC: 4.6 MMOL/L (ref 3.5–5.2)
PROT SERPL-MCNC: 6.5 G/DL (ref 6–8.5)
RBC # BLD AUTO: 4.57 10*6/MM3 (ref 3.77–5.28)
SODIUM SERPL-SCNC: 140 MMOL/L (ref 136–145)
TRIGL SERPL-MCNC: 84 MG/DL (ref 0–150)
TSH SERPL DL<=0.005 MIU/L-ACNC: 1.56 UIU/ML (ref 0.27–4.2)
VLDLC SERPL CALC-MCNC: 16 MG/DL (ref 5–40)
WBC # BLD AUTO: 8.66 10*3/MM3 (ref 3.4–10.8)

## 2025-05-16 NOTE — TELEPHONE ENCOUNTER
Caller: Beronica Price    Relationship to patient: Self    Best call back number: [phone]     Patient is needing: WAS SEEN FOR VISIT YESTERDAY AND STATES WEIGHT WAS INPUT IN CHART WRONG  LB AND NEEDS IT CORRECTED .6 LB

## 2025-06-09 NOTE — PROGRESS NOTES
[facility]     Referring Provider:      Chief complaint: high risk      HPI: Patient presenting to the office today for routine follow-up.  She was previously being followed by Dr. Villarreal in the high-risk clinic and since his [facility] her care has been transferred to me.  Patient is currently on hormone treatment therapy and has been for the last 17 years.  She states she does not want to come off of the hormones.  Had negative genetics in 2019.    I personally reviewed her records and summarized her relevant breast history/imaging:    10/2/2024 bilateral screening mammogram at [facility]  The breasts are extremely dense, which lowers the sensitivity of mammography.  No suspicious masses, significant calcifications or other abnormalities are  seen.  There has been no significant change from the prior exam(s).  IMPRESSION:  There is no mammographic evidence of malignancy.  Screening mammogram in 1 year is recommended.  BI-RADS Category 1: Negative    4/25/2025 bilateral breast MRI at [facility]  FINDINGS: There is heterogeneous fibroglandular tissue. There is  moderate background parenchymal enhancement.  RIGHT BREAST:    No suspicious enhancing mass or area of non-mass enhancement is  identified.  The visualized axilla is within normal limits.  LEFT BREAST:    No suspicious enhancing mass or area of non-mass enhancement is  identified.  The visualized axilla is within normal limits.  EXTRAMAMMARY FINDINGS:  There are no pathologically enlarged internal mammary chain lymph nodes  on either side.    IMPRESSION AND RECOMMENDATION:  No MRI evidence of malignancy in either breast. Recommend correlation  with annual screening mammogram, which is currently overdue, unless  already recently performed elsewhere.  BI-RADS Category 1:    She has a personal history of 2016 left breast benign biopsy       She has a family history of breast cancer in her sister dx age 63.  She denies any family history of ovarian cancer.           Review of Systems - Oncology    Medications:    Current Outpatient Medications:     estradiol (ESTRACE) 0.1 MG/GM vaginal cream, , Disp: , Rfl:     estradiol (VIVELLE-DOT) 0.1 MG/24HR patch, 1 patch 2 (Two) Times a Week., Disp: , Rfl:     Magnesium 200 MG tablet, Take 1 tablet by mouth., Disp: , Rfl:     medroxyPROGESTERone (PROVERA) 2.5 MG tablet, TK 1 T PO  D, Disp: , Rfl: 11    Omega-3 1000 MG capsule, Take 1 capsule by mouth., Disp: , Rfl:     thiamine (VITAMIN B-1) 100 MG tablet  tablet, Take 1 tablet by mouth Daily., Disp: , Rfl:     vitamin D3 125 MCG (5000 UT) capsule capsule, Take 1 capsule by mouth Daily., Disp: , Rfl:     Allergies:  Allergies   Allergen Reactions    Gentamycin [Gentamicin] Hives    Penicillins Hives       Medical history:  Past Medical History:   Diagnosis Date    Bilateral breast cysts     Dense breasts     Hyperlipidemia     Multiple thyroid nodules     Transaminitis     Uterine polyp        Surgical History:  Past Surgical History:   Procedure Laterality Date    ENDOMETRIAL ABLATION  2003    MASTOIDECTOMY      TONSILLECTOMY         Family History:  Family History   Problem Relation Age of Onset    Hypertension Mother         not currently requiring treatment    Asthma Mother     Hypothyroidism Mother     Atrial fibrillation Father     Heart block Father     Heart failure Father     Multiple sclerosis Sister     Breast cancer Sister     No Known Problems Brother     Coronary artery disease Brother 63    Uterine cancer Maternal Grandmother 48    Prostate cancer Maternal Grandfather     Heart block Paternal Grandmother        Social History:   Social History     Socioeconomic History    Marital status:    Tobacco Use    Smoking status: Never    Smokeless tobacco: Never   Vaping Use    Vaping status: Never Used   Substance and Sexual Activity    Alcohol use: Not Currently    Drug use: Never     Patient drinks 1-2 servings of caffeine per day.       GYNECOLOGIC HISTORY:    . P: 2. AB: 0.  Last menstrual period: 43-44 pt had ablation in   Age at menarche: 12  Age at first childbirth: 36  Lactation/How lon year  Age at menopause: 47  Total years of oral contraceptive use: 1-2 years  Total years of hormone replacement therapy: estradiol patch and progesterone pill since age 47 and is still currently taking it.      Physical Exam  There were no vitals filed for this visit.  ECOG 0 - Asymptomatic  General: NAD, well appearing  Psych: a&o x 3, normal mood and affect  Eyes: EOMI, no scleral icterus  ENMT: neck supple without masses or thyromegaly, mucus membranes moist  Resp: normal effort, CTAB  CV: RRR, no murmurs, no edema   GI: soft, NT, ND  MSK: normal gait, normal ROM in bilateral shoulders  Lymph nodes: no cervical, supraclavicular or axillary lymphadenopathy  Breast: symmetric,   Right: No visible abnormalities on inspection while seated, with arms raised or hands on hips. No masses, skin changes, or nipple abnormalities.  Left: No visible abnormalities on inspection while seated, with arms raised or hands on hips. No masses, skin changes, or nipple abnormalities.      Assessment:    Family history of breast cancer  High risk for breast cancer-according to Jaylin 8 she has a risk assessment of 23.2%, Georgina 3.5%  Dense breast    Discussion:  Breast density describes how the breasts look on a mammogram.  Breast and connective tissue are denser than fat and this difference shows up on the mammogram.  Young women often have dense breasts.  As we age, breast become less dense.  Dense breast can make it harder to find breast cancer on the mammogram.  Women with high breast density have an increased risk of breast cancer.  Educational materials regarding breast density were given and reviewed.  Tomosynthesis imaging will be completed with next screening study.    We discussed management options for individuals who are at increased risk (>20% lifetime risk):  1) High risk  screening - Annual mammogram and annual breast MRI, alternating one test every 6 months, biannual clinical exam and monthly self breast exam. She meets criteria for high risk screening.  2) Chemoprevention with Tamoxifen, Raloxifene or Exemestane. These may reduce risk up to 50%. I reviewed that these particular medications are not without risks and the risk/benefit ratio must be considered carefully. She is not interested in this currently.  3) Risk reducing surgery such as prophylactic mastectomy  which may reduce risk by 90-95%. We discussed that this is a relatively radical strategy and is generally reserved for individuals with a known genetic mutation predisposing them to an increased risk (~50% risk) of breast cancer. She does not meet criteria for risk reducing surgery.   4) I discussed the importance of exercise and weight management as part of a risk reducing strategy, since increased BMI is associated with an increased risk of breast cancer.   She does not want a referral to med onc    Discussed that she would stop the hormones her density would fall and she would no longer be considered high risk and therefore not need breast exams twice a year or MRIs.    Plan:  April 2026 MRI and exam        RAJESH Harrison    I have spent 35 mins in face to face time with the patient and in chart review.    CC:  No ref. provider found  Ulises Keller DO    EMR Dragon/transcription disclaimer:  Dictated using Dragon dictation

## 2025-06-10 ENCOUNTER — OFFICE VISIT (OUTPATIENT)
Dept: SURGERY | Facility: CLINIC | Age: 65
End: 2025-06-10
Payer: COMMERCIAL

## 2025-06-10 VITALS
HEIGHT: 68 IN | BODY MASS INDEX: 24.86 KG/M2 | OXYGEN SATURATION: 97 % | DIASTOLIC BLOOD PRESSURE: 64 MMHG | WEIGHT: 164 LBS | SYSTOLIC BLOOD PRESSURE: 104 MMHG | HEART RATE: 69 BPM

## 2025-06-10 DIAGNOSIS — Z91.89 AT HIGH RISK FOR BREAST CANCER: Primary | ICD-10-CM

## 2025-08-18 ENCOUNTER — OFFICE VISIT (OUTPATIENT)
Dept: SURGERY | Facility: CLINIC | Age: 65
End: 2025-08-18
Payer: COMMERCIAL

## 2025-08-18 VITALS
HEIGHT: 68 IN | DIASTOLIC BLOOD PRESSURE: 70 MMHG | OXYGEN SATURATION: 98 % | RESPIRATION RATE: 16 BRPM | WEIGHT: 163.4 LBS | HEART RATE: 64 BPM | BODY MASS INDEX: 24.77 KG/M2 | SYSTOLIC BLOOD PRESSURE: 104 MMHG

## 2025-08-18 DIAGNOSIS — N63.11 MASS OF UPPER OUTER QUADRANT OF RIGHT BREAST: Primary | ICD-10-CM

## 2025-08-18 PROCEDURE — 99213 OFFICE O/P EST LOW 20 MIN: CPT | Performed by: NURSE PRACTITIONER

## 2025-08-19 ENCOUNTER — TELEPHONE (OUTPATIENT)
Dept: SURGERY | Facility: CLINIC | Age: 65
End: 2025-08-19
Payer: COMMERCIAL